# Patient Record
Sex: MALE | Race: WHITE | NOT HISPANIC OR LATINO | Employment: UNEMPLOYED | URBAN - METROPOLITAN AREA
[De-identification: names, ages, dates, MRNs, and addresses within clinical notes are randomized per-mention and may not be internally consistent; named-entity substitution may affect disease eponyms.]

---

## 2020-10-30 ENCOUNTER — HISTORICAL (OUTPATIENT)
Dept: RADIOLOGY | Facility: HOSPITAL | Age: 45
End: 2020-10-30

## 2020-12-16 ENCOUNTER — HISTORICAL (OUTPATIENT)
Dept: ADMINISTRATIVE | Facility: HOSPITAL | Age: 45
End: 2020-12-16

## 2020-12-18 ENCOUNTER — HISTORICAL (OUTPATIENT)
Dept: SURGERY | Facility: HOSPITAL | Age: 45
End: 2020-12-18

## 2022-04-07 ENCOUNTER — HISTORICAL (OUTPATIENT)
Dept: ADMINISTRATIVE | Facility: HOSPITAL | Age: 47
End: 2022-04-07

## 2022-04-23 VITALS
BODY MASS INDEX: 28.31 KG/M2 | OXYGEN SATURATION: 96 % | DIASTOLIC BLOOD PRESSURE: 105 MMHG | WEIGHT: 209 LBS | SYSTOLIC BLOOD PRESSURE: 156 MMHG | HEIGHT: 72 IN

## 2022-05-01 NOTE — HISTORICAL [facility] CERNER
This is a historical note converted from Domo. Formatting and pictures may have been removed.  Please reference [place] for original formatting and attached multimedia. Indication for Surgery  45yoM who had a history of mandibular hardware placed at least 10 yrs ago for a hyoid suspension.? He then presented to an outside ENT with submental erythema and concern of submental abscess.? I&D done by outside physician.? Then referred to us for us of infected hardware?and patient had continued edema and drainage from submental area.? Imaging obtained did not show any further submental fluid collection.? After discussion with and review of imaging with Dr. Hollingsworth there was a concern of a dental infection. Pt referred to dentist who did not find issue with dentition.? Pt put on several different rounds of antibiotics but continued to have submental drainage. Offered to remove potentially infected hardware.? Discussed chances that this may not resolve even after hardware removal, sleep apnea could worsen, dysphagia, numbness.? Pt would like to proceed with surgery.  Preoperative Diagnosis  infected hardware  Postoperative Diagnosis  infected hardware  Operation  removal of infected hardware  Surgeon(s)  Asuncion Barba MD-Resident  Stephon Reddy MD-Attending  Assistant  none  Anesthesia  general endotracheal  Estimated Blood Loss  45cc  Findings  small drainage site just posterior to previous scar that did not appear to have a fistula tract to the hardware.? No purulence noted but thickened inflamed tissue noted in the subcutaneous tissue.  Specimen(s)  submental fistula for culture, mandibular screws for pathology and culture  Complications  none  Technique  After proper informed consent was obtained, the patient was brought to the operating room and placed supine on the operating table. General endotracheal anesthesia was administered.? Local anesthesia with 1% lidocaine with 1:100,000 epinephrine was injected  in the submental area.? The submental area was then prepped with betadine and draped.? A proper time out was then completed.  ?  At the drainage site an elliptical incision was made around it with a 15 blade.? This was carried deep through the subcutaneous tissue.? Using a hemostat a small tract was followed and dissected out.? It did not appear to go any deeper than subcutaneous tissue.? The tissue around it and the skin are thickened and inflamed.? This was excised and sent for tissue culture.? Then using needle tip bovie?the muscles at the inferior mandibular symphysis were dissected off down to the periosteum.? A subperiosteal dissection was carried a little laterally to the left and on the posterior side of the mandible until the two screws were identified.? The screws were firmly in place and were unable to unscrewed with a hemostat.? The prolene suture was then cut and easily removed.? Then using a 2mm cutting bur a trough around the more inferior screw was then drilled until it could be removed with a hemostat.? This was done in similar fashion for the more superior screw.? The stitch was cut but retracted into the tissue and not easily identified.? Using a 2mm cutting bur a trough around the screw was drilled until the screw could be removed with a hemostat.? Both screws were sent for pathology and tissue culture.? The wound bed was copiously irrigated with clindamycin irrigation.? The submental muscles (mylohyoid and geniohyoid)?were re-approximated 3-0 vicryl in an interrupted fashion in multiple layers around a penrose drain.? The subcutaneous tissue was then re-approximated with 3-0 vcryl in an interrupted?fashion.? The skin was re-approximated with 3-0 nylon in an intterupted fashion and the penrose drian was secured with a 3-0 nylon.? The oral cavity was then examined ensure no connections to the floor of the mouth were made.? There was a punctate spot on the left of midline that was oversewed with an  interrupted 3-0 vicryl.? Dentition was not?loose.? The oral cavity was irrigated.? The stomach was suctioned free with an OG.? The patient was turned back to anesthesia, awakened, extubated, and taken to PACU in stable condition. All counts were correct x2. Dr. Reddy was scrubbed and present for the entirety of the?procedure.  ?  Asuncion Barba MD  Otolaryngology Resident [facility]
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
## 2023-09-29 ENCOUNTER — HOSPITAL ENCOUNTER (EMERGENCY)
Facility: HOSPITAL | Age: 48
Discharge: HOME | End: 2023-09-29
Attending: EMERGENCY MEDICINE
Payer: MEDICAID

## 2023-09-29 VITALS
BODY MASS INDEX: 28.17 KG/M2 | WEIGHT: 208 LBS | SYSTOLIC BLOOD PRESSURE: 160 MMHG | HEIGHT: 72 IN | DIASTOLIC BLOOD PRESSURE: 92 MMHG | RESPIRATION RATE: 20 BRPM | OXYGEN SATURATION: 100 % | TEMPERATURE: 98 F | HEART RATE: 95 BPM

## 2023-09-29 DIAGNOSIS — M23.91 INTERNAL DERANGEMENT OF RIGHT KNEE: Primary | ICD-10-CM
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
## 2023-09-29 PROCEDURE — 96372 THER/PROPH/DIAG INJ SC/IM: CPT | Performed by: EMERGENCY MEDICINE

## 2023-09-29 PROCEDURE — 99284 EMERGENCY DEPT VISIT MOD MDM: CPT

## 2023-09-29 PROCEDURE — 25000003 PHARM REV CODE 250: Performed by: EMERGENCY MEDICINE

## 2023-09-29 PROCEDURE — 63600175 PHARM REV CODE 636 W HCPCS: Performed by: EMERGENCY MEDICINE

## 2023-09-29 RX ORDER — KETOROLAC TROMETHAMINE 30 MG/ML
30 INJECTION, SOLUTION INTRAMUSCULAR; INTRAVENOUS
Status: COMPLETED | OUTPATIENT
Start: 2023-09-29 | End: 2023-09-29

## 2023-09-29 RX ORDER — HYDROCODONE BITARTRATE AND ACETAMINOPHEN 7.5; 325 MG/1; MG/1
1 TABLET ORAL EVERY 6 HOURS PRN
Qty: 20 TABLET | Refills: 0 | Status: SHIPPED | OUTPATIENT
Start: 2023-09-29 | End: 2023-10-04

## 2023-09-29 RX ORDER — NAPROXEN 500 MG/1
500 TABLET ORAL 2 TIMES DAILY WITH MEALS
Qty: 20 TABLET | Refills: 0 | Status: SHIPPED | OUTPATIENT
Start: 2023-09-29 | End: 2023-10-09

## 2023-09-29 RX ORDER — HYDROCODONE BITARTRATE AND ACETAMINOPHEN 7.5; 325 MG/1; MG/1
1 TABLET ORAL
Status: COMPLETED | OUTPATIENT
Start: 2023-09-29 | End: 2023-09-29

## 2023-09-29 RX ADMIN — HYDROCODONE BITARTRATE AND ACETAMINOPHEN 1 TABLET: 7.5; 325 TABLET ORAL at 06:09

## 2023-09-29 RX ADMIN — KETOROLAC TROMETHAMINE 30 MG: 30 INJECTION, SOLUTION INTRAMUSCULAR; INTRAVENOUS at 06:09

## 2023-09-29 NOTE — ED PROVIDER NOTES
Encounter Date: 9/29/2023       History     Chief Complaint   Patient presents with    Knee Injury     C/o right knee injury states yesterday he was come down from a latter and fell about foot of the ground and landed on his right knee. Pt. States he can feel popping when he tried to straighten it.      48-year-old male presents to the emergency department after fall off ladder yesterday, states landed on his legs but significant varus stress on his right knee.  Reports able to ambulate since that time however with significant pain.  States feels a clicking in instability in the right lateral knee when trying to ambulate.    The history is provided by the patient.   Knee Injury  This is a new problem. The current episode started yesterday. The problem occurs constantly. The problem has not changed since onset.Pertinent negatives include no shortness of breath. The symptoms are aggravated by walking.     Review of patient's allergies indicates:  No Known Allergies  No past medical history on file.  No past surgical history on file.  No family history on file.     Review of Systems   Constitutional:  Negative for fever.   Respiratory:  Negative for shortness of breath.    Musculoskeletal:  Positive for arthralgias. Negative for back pain.   Neurological:  Negative for weakness and numbness.       Physical Exam     Initial Vitals [09/29/23 0604]   BP Pulse Resp Temp SpO2   (!) 161/102 95 18 98.3 °F (36.8 °C) 100 %      MAP       --         Physical Exam    Nursing note and vitals reviewed.  Constitutional: He appears well-developed and well-nourished. No distress.   HENT:   Head: Normocephalic and atraumatic.   Mouth/Throat: Oropharynx is clear and moist.   Eyes: Conjunctivae are normal. Pupils are equal, round, and reactive to light.   Neck: Neck supple.   Cardiovascular:  Normal rate, regular rhythm and intact distal pulses.           Pulmonary/Chest: No respiratory distress.   Musculoskeletal:         General:  Tenderness (Tenderness of the right lateral margin of the knee extending up to the distal femur area.  No bony tenderness over the proximal tibia or medial knee.  No patellar tenderness or displacement) present.      Cervical back: Neck supple.     Neurological: He is alert and oriented to person, place, and time.   Skin: Skin is warm and dry.         ED Course   Procedures  Labs Reviewed - No data to display       Imaging Results              X-Ray Knee Complete 4 Or More Views Right (Final result)  Result time 09/29/23 07:47:21      Final result by Shaheen Ray MD (09/29/23 07:47:21)                   Impression:      No acute osseous process appreciated.      Electronically signed by: Shaheen Ray  Date:    09/29/2023  Time:    07:47               Narrative:    EXAMINATION:  XR KNEE COMP 4 OR MORE VIEWS RIGHT    CLINICAL HISTORY:  Unspecified fall, initial encounter    TECHNIQUE:  AP, oblique and lateral views of the right knee    COMPARISON:  None    FINDINGS:  No acute fracture identified.  Joint alignments are maintained.  No significant knee effusion.                                       Medications   ketorolac injection 30 mg (30 mg Intramuscular Given 9/29/23 0650)   HYDROcodone-acetaminophen 7.5-325 mg per tablet 1 tablet (1 tablet Oral Given 9/29/23 0650)     Medical Decision Making  Problems Addressed:  Fall: acute illness or injury  Internal derangement of right knee: acute illness or injury    Amount and/or Complexity of Data Reviewed  Radiology: ordered.    Risk  Prescription drug management.      ED assessment:    Mr. Gonzalez presented any pain after fall yesterday.  Ambulatory but with pain.  No gross deformity however feel some instability laterally with ambulation    Differential diagnosis (including but not limited to):   Proximal fibula fracture, patellar dislocation, internal derangement such as collateral ligament injury or meniscal injury.  Less likely knee dislocation or tibia  fracture    ED management:   X-ray reviewed, demonstrates no clinically significant acute abnormality, specifically no displaced fracture or significant joint effusion.  On lateral view, there is a radiodense ovoid opacity noted, considered possible donor site from a proximal fibula avulsion.  Reviewed with orthopedics who advised appears nonacute.  Counseled patient on likely ligamentous injury.  As he is experiencing some instability when trying to walk, will provide immobilizer for when he is ambulating; however, advised to avoid wearing around the clock to prevent excessive stiffness.  Analgesics provided on discharge as well. RICE discussed.  Referral submitted to orthopedic clinic. ED return precautions reviewed at the bedside and provided in the written discharge instructions. All questions answered to the best of my ability.       My independent radiology interpretation:   X-ray right knee:  No acute fracture or subluxation      Amount and/or Complexity of Data Reviewed  Independent historian: none   Summary of history:   External data reviewed: prescription medications   Summary of data reviewed: no current opioid prescriptions, is on adderall  Risk and benefits of testing: discussed     Radiology: ordered and independent interpretation performed (see above or ED course)    Discussion of management or test interpretation with external provider(s): discussed with Orthopedic surgery consultant   Summary of discussion:  As above    Risk  Prescription drug management   Shared decision making     Critical Care  none    I, Mary Bertrand MD personally performed the history, PE, MDM, and procedures as documented above and agree with the scribe's documentation.                              Clinical Impression:   Final diagnoses:  [W19.XXXA] Fall  [M23.91] Internal derangement of right knee (Primary)        ED Disposition Condition    Discharge Stable          ED Prescriptions       Medication Sig Dispense Start Date  End Date Auth. Provider    naproxen (NAPROSYN) 500 MG tablet Take 1 tablet (500 mg total) by mouth 2 (two) times daily with meals. for 10 days 20 tablet 9/29/2023 10/9/2023 Mary Bertrand MD    HYDROcodone-acetaminophen (NORCO) 7.5-325 mg per tablet Take 1 tablet by mouth every 6 (six) hours as needed for Pain. 20 tablet 9/29/2023 10/4/2023 Mary Berrtand MD          Follow-up Information       Follow up With Specialties Details Why Contact Info    [facility]  a referral has been submitted to this orthopedic clinic. Please call the clinic to ensure referral has been received and appointment scheduled. [address] [facility] [address] [phone]    [facility] Emergency Medicine  As needed, If symptoms worsen [address] [facility] [id number] [phone]             Mary Bertrand MD  09/30/23 6770

## 2023-10-05 ENCOUNTER — OFFICE VISIT (OUTPATIENT)
Dept: URGENT CARE | Facility: CLINIC | Age: 48
End: 2023-10-05
Payer: MEDICAID

## 2023-10-05 VITALS
BODY MASS INDEX: 29.4 KG/M2 | HEART RATE: 82 BPM | DIASTOLIC BLOOD PRESSURE: 105 MMHG | RESPIRATION RATE: 18 BRPM | HEIGHT: 71 IN | TEMPERATURE: 99 F | WEIGHT: 210 LBS | OXYGEN SATURATION: 98 % | SYSTOLIC BLOOD PRESSURE: 156 MMHG

## 2023-10-05 DIAGNOSIS — S89.92XD LEFT KNEE INJURY, SUBSEQUENT ENCOUNTER: Primary | ICD-10-CM

## 2023-10-05 PROCEDURE — 63600175 PHARM REV CODE 636 W HCPCS: Performed by: NURSE PRACTITIONER

## 2023-10-05 PROCEDURE — 99213 OFFICE O/P EST LOW 20 MIN: CPT | Mod: S$PBB,,, | Performed by: NURSE PRACTITIONER

## 2023-10-05 PROCEDURE — 99213 PR OFFICE/OUTPT VISIT, EST, LEVL III, 20-29 MIN: ICD-10-PCS | Mod: S$PBB,,, | Performed by: NURSE PRACTITIONER

## 2023-10-05 PROCEDURE — 99215 OFFICE O/P EST HI 40 MIN: CPT | Mod: PBBFAC | Performed by: NURSE PRACTITIONER

## 2023-10-05 RX ORDER — HYDROCODONE BITARTRATE AND ACETAMINOPHEN 7.5; 325 MG/1; MG/1
1 TABLET ORAL EVERY 8 HOURS PRN
Qty: 21 TABLET | Refills: 0 | Status: SHIPPED | OUTPATIENT
Start: 2023-10-05 | End: 2023-10-12

## 2023-10-05 RX ORDER — LISINOPRIL 10 MG/1
10 TABLET ORAL
COMMUNITY
Start: 2023-09-06

## 2023-10-05 RX ORDER — ONDANSETRON 4 MG/1
4 TABLET, ORALLY DISINTEGRATING ORAL EVERY 6 HOURS PRN
COMMUNITY
Start: 2023-08-11 | End: 2023-10-23

## 2023-10-05 RX ORDER — KETOROLAC TROMETHAMINE 30 MG/ML
60 INJECTION, SOLUTION INTRAMUSCULAR; INTRAVENOUS
Status: COMPLETED | OUTPATIENT
Start: 2023-10-05 | End: 2023-10-05

## 2023-10-05 RX ORDER — DULOXETIN HYDROCHLORIDE 60 MG/1
60 CAPSULE, DELAYED RELEASE ORAL 2 TIMES DAILY
COMMUNITY
Start: 2023-09-24

## 2023-10-05 RX ORDER — AMLODIPINE BESYLATE 5 MG/1
5 TABLET ORAL
COMMUNITY
Start: 2023-09-06

## 2023-10-05 RX ORDER — TERBINAFINE HYDROCHLORIDE 250 MG/1
250 TABLET ORAL EVERY MORNING
COMMUNITY
Start: 2023-09-11

## 2023-10-05 RX ORDER — DEXTROAMPHETAMINE SACCHARATE, AMPHETAMINE ASPARTATE, DEXTROAMPHETAMINE SULFATE AND AMPHETAMINE SULFATE 5; 5; 5; 5 MG/1; MG/1; MG/1; MG/1
1 TABLET ORAL 2 TIMES DAILY
COMMUNITY
Start: 2023-09-07

## 2023-10-05 RX ORDER — ARIPIPRAZOLE 5 MG/1
5 TABLET ORAL
COMMUNITY
Start: 2023-09-24

## 2023-10-05 RX ADMIN — KETOROLAC TROMETHAMINE 60 MG: 30 INJECTION, SOLUTION INTRAMUSCULAR at 12:10

## 2023-10-05 NOTE — LETTER
October 5, 2023      [facility]  [address] [facility] [address]  Phone: [phone]       Patient: Harman Gonzalez   YOB: 1975  Date of Visit: 10/05/2023    To Whom It May Concern:    French Gonzalez  was at [facility] on 10/05/2023. The patient may return to work/school on 10/6/23 with no restrictions. If you have any questions or concerns, or if I can be of further assistance, please do not hesitate to contact me.    Sincerely,    GHASSAN Peterson

## 2023-10-05 NOTE — PATIENT INSTRUCTIONS
Please follow instructions on patient education material.    Go to ER for increased swelling , increased pain, warmth, shortness of breath Chest pain, discoloration, numbness and tingling, calf pain redness or swelling.     Knee Immobilizer and Crutches fitted in UC.  Instructed to avoid weight bearing on right knee.  Ok to go to work as long as can wear immobilizer and use at least 1 crutch/ do not increase activity with knee as directed, but it would be wise to use the knee as less as possible to not increase possible injury.  Follow up with [facility] on  10/23 as discussed.  Pain meds Rx'ed/sent to pharmacy- given prescription for Norco- take sparingly and use Naproxen for break through pain.    Please read the attached information about NSAIDs.    Please use the prescriptions that were sent for you.     You got a Toradol shot in clinic today. For the next 8 hours do not take:  Ibuprofen  Naproxen  Advil  Aleve  Motrin  Ketorolac   Diclofenac  Meloxicam   Elevate as much as possible.  Light ROM exercises as tolerated.  Very light weightbearing as tolerated.   Limit icing the knee to 3x/day, 10 mins max.

## 2023-10-05 NOTE — PROGRESS NOTES
"Subjective:      Patient ID: Harman Gonzalez is a 48 y.o. male.    Vitals:  height is 5' 11" (1.803 m) and weight is 95.3 kg (210 lb). His oral temperature is 98.9 °F (37.2 °C). His blood pressure is 156/105 (abnormal) and his pulse is 82. His respiration is 18 and oxygen saturation is 98%.     Chief Complaint: Leg Injury (Pt reports pain in right knee. Fell last week. Was seen at [facility] ER. Was prescribed meds and referred to ortho (can't get in til end of the month). Ran out of medicine. Was told by ortho to come here.   )    HPI As stated in CC. Pt was seen in ER 9/28/2029, schedules to See Ortho 10/23/203, but need pain medicine. Pt reports he has taken all Norco prescribed to him and still had Naproxen. 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  Pt denies sob, chest pain or fever.   ROS   Objective:     Physical Exam   Constitutional: He appears well-developed.  Non-toxic appearance. He does not appear ill. No distress.   Abdominal: Normal appearance.   Musculoskeletal:         General: Tenderness present.      Comments: Removed brace for exam.- No patella tenderness or deformity, mild TTP at lateral and medial knee, no discoloration or edema to extremity, mild swelling over knee and medial area of knee, no calf tightness, negative jaylin's sign, good cap reill, good strong pedal pulse, able to wiggle toes.    Neurological: no focal deficit. He is alert. He displays no weakness.   Skin: Skin is not diaphoretic. Capillary refill takes less than 2 seconds.   Psychiatric: His behavior is normal. Mood normal.   Nursing note and vitals reviewed.      Assessment:     1. Left knee injury, subsequent encounter    X-Ray Knee Complete 4 Or More Views Right    Result Date: 9/29/2023  EXAMINATION: XR KNEE COMP 4 OR MORE VIEWS RIGHT CLINICAL HISTORY: Unspecified fall, initial encounter TECHNIQUE: AP, oblique and lateral views of the right knee COMPARISON: None FINDINGS: No acute fracture identified.  " Joint alignments are maintained.  No significant knee effusion.     No acute osseous process appreciated. Electronically signed by: Shaheen Ray Date:    09/29/2023 Time:    07:47       Plan:   Er precautions given.  Ok to go to work as long as can wear immobilizer and use at least 1 crutch/ do not increase activity with knee as directed, but it would be wise to use the knee as less as possible to not increase possible injury.  Follow up with [facility] Orth[facility] on  10/23 as discussed.  Pain meds Rx'ed/sent to pharmacy- given prescription for Norco- take sparingly and use Naproxen for break through pain.  Please read the attached information about NSAIDs.You got a Toradol shot in clinic today. For the next 8 hours do not take more of NSAID.     Left knee injury, subsequent encounter  -     HYDROcodone-acetaminophen (NORCO) 7.5-325 mg per tablet; Take 1 tablet by mouth every 8 (eight) hours as needed for Pain.  Dispense: 21 tablet; Refill: 0  -     ketorolac injection 60 mg

## 2023-10-23 ENCOUNTER — HOSPITAL ENCOUNTER (OUTPATIENT)
Dept: RADIOLOGY | Facility: HOSPITAL | Age: 48
Discharge: HOME | End: 2023-10-23
Attending: STUDENT IN AN ORGANIZED HEALTH CARE EDUCATION/TRAINING PROGRAM
Payer: MEDICAID

## 2023-10-23 ENCOUNTER — OFFICE VISIT (OUTPATIENT)
Dept: ORTHOPEDICS | Facility: CLINIC | Age: 48
End: 2023-10-23
Payer: MEDICAID

## 2023-10-23 VITALS
SYSTOLIC BLOOD PRESSURE: 163 MMHG | DIASTOLIC BLOOD PRESSURE: 104 MMHG | BODY MASS INDEX: 29.68 KG/M2 | HEIGHT: 71 IN | WEIGHT: 212 LBS | HEART RATE: 69 BPM

## 2023-10-23 DIAGNOSIS — M25.561 RIGHT KNEE PAIN, UNSPECIFIED CHRONICITY: ICD-10-CM

## 2023-10-23 DIAGNOSIS — M23.91 INTERNAL DERANGEMENT OF RIGHT KNEE: Primary | ICD-10-CM

## 2023-10-23 PROCEDURE — 99214 OFFICE O/P EST MOD 30 MIN: CPT | Mod: PBBFAC

## 2023-10-23 PROCEDURE — 73564 X-RAY EXAM KNEE 4 OR MORE: CPT | Mod: TC,RT

## 2023-10-23 RX ORDER — TERBINAFINE HYDROCHLORIDE 250 MG/1
250 TABLET ORAL
COMMUNITY
Start: 2023-08-10 | End: 2023-11-08

## 2023-10-23 RX ORDER — DICLOFENAC SODIUM 75 MG/1
75 TABLET, DELAYED RELEASE ORAL 3 TIMES DAILY PRN
Qty: 30 TABLET | Refills: 0 | Status: SHIPPED | OUTPATIENT
Start: 2023-10-23 | End: 2023-11-22

## 2023-10-23 RX ORDER — BUSPIRONE HYDROCHLORIDE 15 MG/1
15 TABLET ORAL 3 TIMES DAILY
COMMUNITY
Start: 2023-10-09

## 2023-10-23 RX ORDER — CLONIDINE HYDROCHLORIDE 0.1 MG/1
0.1 TABLET ORAL DAILY PRN
COMMUNITY
Start: 2023-02-10 | End: 2024-02-10

## 2023-10-23 RX ORDER — ALBUTEROL SULFATE 90 UG/1
AEROSOL, METERED RESPIRATORY (INHALATION)
COMMUNITY
Start: 2023-01-20
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
## 2023-10-30 NOTE — PROGRESS NOTES
Faculty Attestation: Harman Gonzalez  was seen in Sports Medicine Clinic. Discussed with Dr. Sandoval at the time of the visit. History of Present Illness, Physical Exam, and Assessment and Plan reviewed. Treatment plan is reasonable and appropriate. Compliance with treatment recommendations is important.  Radiology images independently reviewed and agree with fellow interpretation.  No procedure was performed.     Shila Eaton MD  Sports Medicine

## 2023-11-07 ENCOUNTER — OFFICE VISIT (OUTPATIENT)
Dept: ORTHOPEDICS | Facility: CLINIC | Age: 48
End: 2023-11-07
Payer: MEDICAID

## 2023-11-07 VITALS
WEIGHT: 212 LBS | HEART RATE: 89 BPM | SYSTOLIC BLOOD PRESSURE: 147 MMHG | BODY MASS INDEX: 29.68 KG/M2 | HEIGHT: 71 IN | DIASTOLIC BLOOD PRESSURE: 97 MMHG

## 2023-11-07 DIAGNOSIS — S83.511D RUPTURE OF ANTERIOR CRUCIATE LIGAMENT OF RIGHT KNEE, SUBSEQUENT ENCOUNTER: Primary | ICD-10-CM

## 2023-11-07 PROCEDURE — 99213 OFFICE O/P EST LOW 20 MIN: CPT | Mod: PBBFAC

## 2023-11-07 RX ORDER — SILDENAFIL 100 MG/1
100 TABLET, FILM COATED ORAL DAILY PRN
COMMUNITY
Start: 2023-11-03

## 2023-11-07 RX ORDER — DOXEPIN HYDROCHLORIDE 25 MG/1
25 CAPSULE ORAL NIGHTLY
COMMUNITY
Start: 2023-11-06

## 2023-11-07 NOTE — PROGRESS NOTES
"Subjective:    Patient ID: Harman Gonzalez is a 48 y.o. male  who presented to [facility] for follow up.  Established patient      Chief Complaint: Pain of the Right Knee (Here for right knee pain, MRI done on 10/27.)      History of Present Illness: Harman Gonzalez presented today to review MRI results from a right knee injury that he sustained on 09/28/2023.  Original mechanism of injury was stepping down off of a stool at home with his knee flexed where he felt immediate pain and burning but denied hearing a pop.  He has been experiencing instability, giving-way and mechanical symptoms with daily activities limiting work related activities.  Otherwise he is had no new injuries.  He continues with home exercise program and oral medications.    Knee Review of Systems:  Swelling?  yes  Instability?  yes  Mechanical sx?  yes  <30 min AM stiffness? yes  Limited ROM? yes  Fever/Chills? no       Objective:      Physical Exam:    BP (!) 147/97   Pulse 89   Ht 5' 11" (1.803 m)   Wt 96.2 kg (212 lb)   BMI 29.57 kg/m²     Ortho/SPM Exam    Appearance:  limping  FWB  Alignment: Left: normal Right: normal   Soft tissue swelling: Left: no Right: yes  Effusion: Left:  Negative Right: Positive  Erythema: Left no Right: no  Ecchymosis: Left: no Right: no  Atrophy: Left: no Right: no     Palpation:  Knee Tenderness: Left: None Right: Medial joint line, Lateral joint line, Patella, Patellar tendon, Medial collateral ligament, and lateral collateral ligament     Range of motion:  Flexion (140): Left:  140 Right: 140  Extension (0): Left: 0 Right: 0     Strength:  Extension: Left 5/5  Pain: no     Right 5/5 Pain: no  Flexion: Left 5/5 Pain: no        Right   5/5 Pain: no      Special Tests:  Ballotable Effusion:Left: Negative Right: Positive   Fluid Wave: Left: Negative Right: Negative   Crepitus: Left: Negative Right: Positive   Patellar grind test: Left: Negative  Right: " Negative  Apprehension test: Left: Negative Right: Negative   Varus: @ 0, Left Negative Right: Negative.  @ 30, Left Negative  Right Negative   Valgus: @ 0, Left Negative Right: Negative.  @ 30, Left Negative  Right Negative  Lachman:  Unable to perform secondary to increased tone and fasciculations.    Ant Drawer: Left: Negative Right: poor endpoint  Posterior Drawer: Left: Negative Right: Negative      General appearance: NAD  Peripheral pulses: normal bilaterally   Reflexes: Left: normal Right normal   Sensation: normal    Imaging:   Previous images reviewed.  X-rays ordered and performed today: no    10/28/2023 MRI: Osseous contusion mild impaction at the posterior lateral tibial plateau without significant articular surface depression disruption.  Grade 2 ACL sprain.  Suspected grade 1 MCL sprain with mild chronic scarring degeneration of the proximal 3rd of the ligament.  Mild patellar tendinosis with no tendon tear.      Assessment:        Encounter Diagnoses   Code Name Primary?    S83.511D Rupture of anterior cruciate ligament of right knee, subsequent encounter Yes        Plan:      Dx:  Right grade 2 ACL sprain and grade 1 MCL - acute injury in moderate exacerbation.  Treatment Plan: Discussed with patient diagnosis, prognosis, and treatment recommendations. Education provided.    - Discussed operative versus nonoperative management patient is electing for operative management today.  The patient has a partial ACL tear but is functionally unstable.  He is interested in discussion with Orthopedics about surgical interventions for instability and mechanical symptoms.   Imaging: prior radiological studies independently reviewed; discussed with patient; agree with radiologist interpretation.   Activity: Activity as tolerated; HEP to include aerobic conditioning and strength training with non-painful activity. ROM/STG exercises. Proper footware; assistive devises to avoid limping.   Therapy: Physical Therapy;   hinged knee brace  Medication:  Continue home medications. Please see your primary care physician for further refills.  RTC: PRN; call if any issues.  Schedule with Orthopedic Surgery for discussion    Ronen Sandoval MD.  Note dictated using MModal.

## 2023-11-07 NOTE — PROGRESS NOTES
Faculty Attestation: Harman Gonzalez  was seen in Sports Medicine Clinic. Discussed with Dr. Sandoval at the time of the visit. History of Present Illness, Physical Exam, and Assessment and Plan reviewed. Treatment plan is reasonable and appropriate. Compliance with treatment recommendations is important.  Radiology images independently reviewed and agree with radiologist interpretation. No procedure was performed.     Beverly Salazar MD  Sports Medicine

## 2023-12-18 ENCOUNTER — OFFICE VISIT (OUTPATIENT)
Dept: ORTHOPEDICS | Facility: CLINIC | Age: 48
End: 2023-12-18
Payer: MEDICAID

## 2023-12-18 VITALS
TEMPERATURE: 98 F | RESPIRATION RATE: 20 BRPM | HEIGHT: 71 IN | DIASTOLIC BLOOD PRESSURE: 111 MMHG | WEIGHT: 221.38 LBS | OXYGEN SATURATION: 97 % | BODY MASS INDEX: 30.99 KG/M2 | SYSTOLIC BLOOD PRESSURE: 164 MMHG | HEART RATE: 87 BPM

## 2023-12-18 DIAGNOSIS — S83.511A RUPTURE OF ANTERIOR CRUCIATE LIGAMENT OF RIGHT KNEE, INITIAL ENCOUNTER: Primary | ICD-10-CM

## 2023-12-18 PROCEDURE — 99499 NO LOS: ICD-10-PCS | Mod: S$PBB,,, | Performed by: ORTHOPAEDIC SURGERY

## 2023-12-18 PROCEDURE — 3008F BODY MASS INDEX DOCD: CPT | Mod: CPTII,,, | Performed by: ORTHOPAEDIC SURGERY

## 2023-12-18 PROCEDURE — 1159F MED LIST DOCD IN RCRD: CPT | Mod: CPTII,,, | Performed by: ORTHOPAEDIC SURGERY

## 2023-12-18 PROCEDURE — 3080F PR MOST RECENT DIASTOLIC BLOOD PRESSURE >= 90 MM HG: ICD-10-PCS | Mod: CPTII,,, | Performed by: ORTHOPAEDIC SURGERY

## 2023-12-18 PROCEDURE — 99499 UNLISTED E&M SERVICE: CPT | Mod: S$PBB,,, | Performed by: ORTHOPAEDIC SURGERY

## 2023-12-18 PROCEDURE — 3077F PR MOST RECENT SYSTOLIC BLOOD PRESSURE >= 140 MM HG: ICD-10-PCS | Mod: CPTII,,, | Performed by: ORTHOPAEDIC SURGERY

## 2023-12-18 PROCEDURE — 3008F PR BODY MASS INDEX (BMI) DOCUMENTED: ICD-10-PCS | Mod: CPTII,,, | Performed by: ORTHOPAEDIC SURGERY

## 2023-12-18 PROCEDURE — 3080F DIAST BP >= 90 MM HG: CPT | Mod: CPTII,,, | Performed by: ORTHOPAEDIC SURGERY

## 2023-12-18 PROCEDURE — 3077F SYST BP >= 140 MM HG: CPT | Mod: CPTII,,, | Performed by: ORTHOPAEDIC SURGERY

## 2023-12-18 PROCEDURE — 99214 OFFICE O/P EST MOD 30 MIN: CPT | Mod: PBBFAC

## 2023-12-18 PROCEDURE — 1159F PR MEDICATION LIST DOCUMENTED IN MEDICAL RECORD: ICD-10-PCS | Mod: CPTII,,, | Performed by: ORTHOPAEDIC SURGERY

## 2023-12-18 PROCEDURE — 4010F ACE/ARB THERAPY RXD/TAKEN: CPT | Mod: CPTII,,, | Performed by: ORTHOPAEDIC SURGERY

## 2023-12-18 PROCEDURE — 4010F PR ACE/ARB THEARPY RXD/TAKEN: ICD-10-PCS | Mod: CPTII,,, | Performed by: ORTHOPAEDIC SURGERY

## 2023-12-18 NOTE — PROGRESS NOTES
[facility]  Established Patient Office Visit  12/18/2023       Patient ID: Harman Gonzalez  YOB: 1975  MRN: [id number]    Chief Complaint: Numbness, Pain, and Swelling of the Right Knee (Here for f/u check up. Complaining on his right foot that is on Pain and he said his taking tylenol but its not helping.)      HPI:  Harman Gonzalez is a 48 y.o. male who sustained a right knee injury in September of this year.  Who was stepping off of a step ladder onto his right knee and had a twisting injury.  Afterwards he was unable to bear significant weight.  His pain has somewhat improved, but still significantly painful.  He does no significant instability of the knee.  He works as a  in his not been able to significantly do his job secondary to knee instability and pain.  He has been undergoing a home exercise program and has full range of motion.    Past Medical History:    History reviewed. No pertinent past medical history.  History reviewed. No pertinent surgical history.  History reviewed. No pertinent family history.  Social History     Socioeconomic History    Marital status: Single   Tobacco Use    Smoking status: Every Day     Types: Cigarettes, Vaping with nicotine    Smokeless tobacco: Never   Substance and Sexual Activity    Alcohol use: Not Currently    Drug use: Not Currently    Sexual activity: Yes   Social History Narrative    ** Merged History Encounter **          Medication List with Changes/Refills   Current Medications    ALBUTEROL (PROVENTIL/VENTOLIN HFA) 90 MCG/ACTUATION INHALER    TAKE 2 PUFFS (INHALATION) 4 TIMES PER DAY AS NEEDED FOR COUGH) FOR 10 DAYS    AMLODIPINE (NORVASC) 5 MG TABLET    Take 5 mg by mouth.    ARIPIPRAZOLE (ABILIFY) 5 MG TAB    Take 5 mg by mouth.    BUSPIRONE (BUSPAR) 15 MG TABLET    Take 15 mg by mouth 3 (three) times daily.    CLONIDINE (CATAPRES) 0.1 MG TABLET    Take 0.1 mg by mouth daily as needed.     DEXTROAMPHETAMINE-AMPHETAMINE (ADDERALL) 20 MG TABLET    Take 1 tablet by mouth 2 (two) times daily.    DOXEPIN (SINEQUAN) 25 MG CAPSULE    Take 25 mg by mouth every evening.    DULOXETINE (CYMBALTA) 60 MG CAPSULE    Take 60 mg by mouth 2 (two) times daily.    LISINOPRIL 10 MG TABLET    Take 10 mg by mouth.    SILDENAFIL (VIAGRA) 100 MG TABLET    Take 100 mg by mouth daily as needed.    TERBINAFINE HCL (LAMISIL) 250 MG TABLET    Take 250 mg by mouth every morning.     Review of patient's allergies indicates:   Allergen Reactions    Cortisone      Other reaction(s): uncontrollable hiccups    Iodinated contrast media Rash       Physical Exam:    Right Lower extremity:  No gross deformity, open wounds, abrasions, effusions  Grossly TTP over the knee, medial and lateral joint lines  Motor intact: ehl/fhl/ta/gsc/ham/quad/ip  SILT: dp/sp/t/ring/sa  Knee range of motion:  0-135 degrees.  2B Lachman.  Positive anterior drawer.  Negative posterior drawer.  Knee is stable to varus and valgus stress at 0 and 30°  2+ dp pulse    Imaging:  Independent review  10/23/2023: X-ray right knee demonstrates no acute osseous abnormalities, fractures, or dislocations  10/23/2023: MRI right knee demonstrates a proximal ACL tear that appears full-thickness on MRI.  There is some edema around the MCL.  No meniscal tears    Assessment and Plan:    Harman Gonzalez is a 48 y.o. male with right knee ACL tear and grade 1 MCL sprain.    -risks and benefits of operative and nonoperative treatment were discussed with the patient at length.  In regards to the ACL given his activity level I recommend ACL reconstruction.  For the MCL recommend nonoperative treatment.  Patient is in agreement  -01/16/2023: Right knee arthroscopy with ACL reconstruction using allograft      Camilo Rodriguez MD  [facility] Orthopaedic Surgery PGY-5

## 2023-12-18 NOTE — PROGRESS NOTES
Faculty Attestation: Harman Gonzalez  was seen at [facility] in the Orthopaedic Clinic. History of Present Illness, Physical Exam, and Assessment and Plan reviewed. Treatment plan is reasonable and appropriate. Compliance with treatment recommendations is important. Discussed with the resident at the time of the visit.  No procedure was performed.     Madhu Correa Jr, MD  Orthopaedic Surgery

## 2023-12-27 ENCOUNTER — TELEPHONE (OUTPATIENT)
Dept: ORTHOPEDICS | Facility: CLINIC | Age: 48
End: 2023-12-27
Payer: MEDICAID

## 2023-12-27 NOTE — TELEPHONE ENCOUNTER
Patient called would like to talk to you about his surgery has question about using allograft vs autograft  for the ACL repair and how long does the allograft tendon last for.    Please call to talk with him?

## 2024-01-10 RX ORDER — MUPIROCIN 20 MG/G
OINTMENT TOPICAL
Status: CANCELLED | OUTPATIENT
Start: 2024-01-10

## 2024-01-10 RX ORDER — SODIUM CHLORIDE 9 MG/ML
INJECTION, SOLUTION INTRAVENOUS CONTINUOUS
Status: CANCELLED | OUTPATIENT
Start: 2024-01-10

## 2024-01-11 ENCOUNTER — ANESTHESIA EVENT (OUTPATIENT)
Dept: SURGERY | Facility: HOSPITAL | Age: 49
End: 2024-01-11
Payer: MEDICAID

## 2024-01-11 PROBLEM — G47.30 SLEEP APNEA: Status: ACTIVE | Noted: 2024-01-11

## 2024-01-11 PROBLEM — Z91.041 CONTRAST MEDIA ALLERGY: Status: ACTIVE | Noted: 2024-01-11

## 2024-01-11 PROBLEM — I10 HYPERTENSION: Status: ACTIVE | Noted: 2024-01-11

## 2024-01-11 PROBLEM — F90.9 ADHD: Status: ACTIVE | Noted: 2024-01-11

## 2024-01-11 NOTE — ANESTHESIA PREPROCEDURE EVALUATION
"  Harman Gonzalez is a 48 y.o. male presenting for RECONSTRUCTION, KNEE, ACL, USING GRAFT (Right) with a history of right ACL injury, sleep apnea, HTN (poorly contolled) , borderline HLD, cervical spondylosis, ADHD, Depression, BMI 30, former smoker, occ marijuana use      Vitals:    01/16/24 0722 01/16/24 0730 01/16/24 0746 01/16/24 0807   BP: (!) 156/108 (!) 137/93 (!) 137/93 133/72   Pulse:    88   Resp:    20   Temp:    36 °C (96.8 °F)   TempSrc:    Temporal   SpO2:    100%   Weight:          Allergies to cortisone and contrast media    BETA-BLOCKER:  None    New Orders for Anesthesia: None    Patient Active Problem List   Diagnosis    Hypertension    Sleep apnea    Contrast media allergy    ADHD       Past Surgical History:   Procedure Laterality Date    TONSILLECTOMY         Lab Results   Component Value Date    WBC 9.8 08/10/2023    HGB 16.3 08/10/2023    HCT 47.4 08/10/2023     08/10/2023       No results found for: "NA", "K", "CHLORIDE", "BUN", "CREATININE", "GLUCOSE", "CALCIUM", "ALKPHOS", "LABPROT", "ALBUMIN", "BILIDIR", "IBILI", "AST", "ALT", "MG", "PHOS", "[id number]"    Lab Results   Component Value Date    HGBA1C 5.6 11/23/2020       Lab Results   Component Value Date    TSH 0.700 08/10/2023       Current Outpatient Medications   Medication Instructions    albuterol (PROVENTIL/VENTOLIN HFA) 90 mcg/actuation inhaler TAKE 2 PUFFS (INHALATION) 4 TIMES PER DAY AS NEEDED FOR COUGH) FOR 10 DAYS    amLODIPine (NORVASC) 5 mg, Oral    ARIPiprazole (ABILIFY) 5 mg, Oral    busPIRone (BUSPAR) 15 mg, Oral, 3 times daily    cloNIDine (CATAPRES) 0.1 mg, Oral, Daily PRN    dextroamphetamine-amphetamine (ADDERALL) 20 mg tablet 1 tablet, Oral, 2 times daily    doxepin (SINEQUAN) 25 mg, Oral, Nightly    DULoxetine (CYMBALTA) 60 mg, Oral, 2 times daily    lisinopriL 10 mg, Oral    sildenafiL (VIAGRA) 100 mg, Oral, Daily PRN    terbinafine HCL (LAMISIL) 250 mg, Oral, Every morning       Past anesthesia records: " None    Lab Results   Component Value Date    WBC 9.8 08/10/2023    HGB 16.3 08/10/2023    HCT 47.4 08/10/2023     08/10/2023    CHOL 185 08/10/2023    TRIG 60 08/10/2023    HDL 56 08/10/2023    TSH 0.700 08/10/2023    HGBA1C 5.6 11/23/2020        Pre-op Assessment    I have reviewed the Patient Summary Reports.     I have reviewed the Nursing Notes. I have reviewed the NPO Status.   I have reviewed the Medications.     Review of Systems  Anesthesia Hx:  No problems with previous Anesthesia   History of prior surgery of interest to airway management or planning:          Denies Family Hx of Anesthesia complications.    Denies Personal Hx of Anesthesia complications.                    Hematology/Oncology:  Hematology Normal   Oncology Normal                                   EENT/Dental:  EENT/Dental Normal           Cardiovascular:  Cardiovascular Normal                                            Pulmonary:  Pulmonary Normal                       Renal/:  Renal/ Normal                 Hepatic/GI:  Hepatic/GI Normal                 Musculoskeletal:  Musculoskeletal Normal                Neurological:  Neurology Normal                                      Endocrine:  Endocrine Normal            Dermatological:  Skin Normal    Psych:  Psychiatric Normal                    Physical Exam  General: Cooperative, Well nourished, Alert and Oriented    Airway:  Mallampati: II / III/ II  Mouth Opening: Normal  TM Distance: Normal  Tongue: Normal  Neck ROM: Normal ROM    Dental:  Intact        Anesthesia Plan  Type of Anesthesia, risks & benefits discussed:    Anesthesia Type: Gen Supraglottic Airway  Intra-op Monitoring Plan: Standard ASA Monitors  Post Op Pain Control Plan: multimodal analgesia, peripheral nerve block and IV/PO Opioids PRN  Induction:  IV  Airway Plan: Direct  Informed Consent: Informed consent signed with the Patient and all parties understand the risks and agree with anesthesia plan.  All  questions answered. Patient consented to blood products? No  ASA Score: 2  Day of Surgery Review of History & Physical: H&P Update referred to the surgeon/provider.    Ready For Surgery From Anesthesia Perspective.     .

## 2024-01-14 NOTE — H&P
Interval H&P    Patient seen and examined in preop holding area. No changes to history or exam other than noted below.    To OR today for right knee arthroscopy, ACL reconstruction with allograft    [facility]  Established Patient Office Visit  12/18/2023         Patient ID: Harman Gonzalez  YOB: 1975  MRN: [id number]     Chief Complaint: Numbness, Pain, and Swelling of the Right Knee (Here for f/u check up. Complaining on his right foot that is on Pain and he said his taking tylenol but its not helping.)        HPI:  Harman Gonzalez is a 48 y.o. male who sustained a right knee injury in September of this year.  Who was stepping off of a step ladder onto his right knee and had a twisting injury.  Afterwards he was unable to bear significant weight.  His pain has somewhat improved, but still significantly painful.  He does no significant instability of the knee.  He works as a  in his not been able to significantly do his job secondary to knee instability and pain.  He has been undergoing a home exercise program and has full range of motion.     Past Medical History:     History reviewed. No pertinent past medical history.  History reviewed. No pertinent surgical history.  History reviewed. No pertinent family history.  Social History               Socioeconomic History    Marital status: Single   Tobacco Use    Smoking status: Every Day       Types: Cigarettes, Vaping with nicotine    Smokeless tobacco: Never   Substance and Sexual Activity    Alcohol use: Not Currently    Drug use: Not Currently    Sexual activity: Yes   Social History Narrative     ** Merged History Encounter **                    Medication List with Changes/Refills   Current Medications     ALBUTEROL (PROVENTIL/VENTOLIN HFA) 90 MCG/ACTUATION INHALER    TAKE 2 PUFFS (INHALATION) 4 TIMES PER DAY AS NEEDED FOR COUGH) FOR 10 DAYS     AMLODIPINE (NORVASC) 5 MG TABLET    Take 5 mg by  mouth.     ARIPIPRAZOLE (ABILIFY) 5 MG TAB    Take 5 mg by mouth.     BUSPIRONE (BUSPAR) 15 MG TABLET    Take 15 mg by mouth 3 (three) times daily.     CLONIDINE (CATAPRES) 0.1 MG TABLET    Take 0.1 mg by mouth daily as needed.     DEXTROAMPHETAMINE-AMPHETAMINE (ADDERALL) 20 MG TABLET    Take 1 tablet by mouth 2 (two) times daily.     DOXEPIN (SINEQUAN) 25 MG CAPSULE    Take 25 mg by mouth every evening.     DULOXETINE (CYMBALTA) 60 MG CAPSULE    Take 60 mg by mouth 2 (two) times daily.     LISINOPRIL 10 MG TABLET    Take 10 mg by mouth.     SILDENAFIL (VIAGRA) 100 MG TABLET    Take 100 mg by mouth daily as needed.     TERBINAFINE HCL (LAMISIL) 250 MG TABLET    Take 250 mg by mouth every morning.            Review of patient's allergies indicates:   Allergen Reactions    Cortisone         Other reaction(s): uncontrollable hiccups    Iodinated contrast media Rash         Physical Exam:     Right Lower extremity:  No gross deformity, open wounds, abrasions, effusions  Grossly TTP over the knee, medial and lateral joint lines  Motor intact: ehl/fhl/ta/gsc/ham/quad/ip  SILT: dp/sp/t/ring/sa  Knee range of motion:  0-135 degrees.  2B Lachman.  Positive anterior drawer.  Negative posterior drawer.  Knee is stable to varus and valgus stress at 0 and 30°  2+ dp pulse     Imaging:  Independent review  10/23/2023: X-ray right knee demonstrates no acute osseous abnormalities, fractures, or dislocations  10/23/2023: MRI right knee demonstrates a proximal ACL tear that appears full-thickness on MRI.  There is some edema around the MCL.  No meniscal tears     Assessment and Plan:     Harman Gonzalez is a 48 y.o. male with right knee ACL tear and grade 1 MCL sprain.     -risks and benefits of operative and nonoperative treatment were discussed with the patient at length.  In regards to the ACL given his activity level I recommend ACL reconstruction.  For the MCL recommend nonoperative treatment.  Patient is in  agreement  -01/16/2023: Right knee arthroscopy with ACL reconstruction using allograft        Camilo Rodriguez MD  [facility] Orthopaedic Surgery PGY-5

## 2024-01-16 ENCOUNTER — HOSPITAL ENCOUNTER (OUTPATIENT)
Facility: HOSPITAL | Age: 49
Discharge: HOME | End: 2024-01-16
Attending: ORTHOPAEDIC SURGERY | Admitting: ORTHOPAEDIC SURGERY
Payer: MEDICAID

## 2024-01-16 ENCOUNTER — ANESTHESIA (OUTPATIENT)
Dept: SURGERY | Facility: HOSPITAL | Age: 49
End: 2024-01-16
Payer: MEDICAID

## 2024-01-16 DIAGNOSIS — S83.511A RUPTURE OF ANTERIOR CRUCIATE LIGAMENT OF RIGHT KNEE, INITIAL ENCOUNTER: ICD-10-CM

## 2024-01-16 PROCEDURE — 36000711: Performed by: ORTHOPAEDIC SURGERY

## 2024-01-16 PROCEDURE — 64447 NJX AA&/STRD FEMORAL NRV IMG: CPT | Mod: 59,RT | Performed by: SPECIALIST

## 2024-01-16 PROCEDURE — D9220A PRA ANESTHESIA: Mod: ANES,,, | Performed by: SPECIALIST

## 2024-01-16 PROCEDURE — 29888 ARTHRS AID ACL RPR/AGMNTJ: CPT | Mod: RT,,, | Performed by: ORTHOPAEDIC SURGERY

## 2024-01-16 PROCEDURE — C1762 CONN TISS, HUMAN(INC FASCIA): HCPCS | Performed by: ORTHOPAEDIC SURGERY

## 2024-01-16 PROCEDURE — 25000003 PHARM REV CODE 250: Performed by: NURSE ANESTHETIST, CERTIFIED REGISTERED

## 2024-01-16 PROCEDURE — C1889 IMPLANT/INSERT DEVICE, NOC: HCPCS | Performed by: ORTHOPAEDIC SURGERY

## 2024-01-16 PROCEDURE — 36000710: Performed by: ORTHOPAEDIC SURGERY

## 2024-01-16 PROCEDURE — 63600175 PHARM REV CODE 636 W HCPCS: Performed by: SPECIALIST

## 2024-01-16 PROCEDURE — 37000009 HC ANESTHESIA EA ADD 15 MINS: Performed by: ORTHOPAEDIC SURGERY

## 2024-01-16 PROCEDURE — 71000015 HC POSTOP RECOV 1ST HR: Performed by: ORTHOPAEDIC SURGERY

## 2024-01-16 PROCEDURE — 27201423 OPTIME MED/SURG SUP & DEVICES STERILE SUPPLY: Performed by: ORTHOPAEDIC SURGERY

## 2024-01-16 PROCEDURE — 71000033 HC RECOVERY, INTIAL HOUR: Performed by: ORTHOPAEDIC SURGERY

## 2024-01-16 PROCEDURE — 63600175 PHARM REV CODE 636 W HCPCS: Performed by: ORTHOPAEDIC SURGERY

## 2024-01-16 PROCEDURE — 63600175 PHARM REV CODE 636 W HCPCS: Performed by: STUDENT IN AN ORGANIZED HEALTH CARE EDUCATION/TRAINING PROGRAM

## 2024-01-16 PROCEDURE — 71000016 HC POSTOP RECOV ADDL HR: Performed by: ORTHOPAEDIC SURGERY

## 2024-01-16 PROCEDURE — D9220A PRA ANESTHESIA: Mod: CRNA,,, | Performed by: NURSE ANESTHETIST, CERTIFIED REGISTERED

## 2024-01-16 PROCEDURE — C1713 ANCHOR/SCREW BN/BN,TIS/BN: HCPCS | Performed by: ORTHOPAEDIC SURGERY

## 2024-01-16 PROCEDURE — 37000008 HC ANESTHESIA 1ST 15 MINUTES: Performed by: ORTHOPAEDIC SURGERY

## 2024-01-16 PROCEDURE — 63600175 PHARM REV CODE 636 W HCPCS: Performed by: NURSE ANESTHETIST, CERTIFIED REGISTERED

## 2024-01-16 DEVICE — SCREW FASTTHREAD INTRF 9X30MM: Type: IMPLANTABLE DEVICE | Site: KNEE | Status: FUNCTIONAL

## 2024-01-16 DEVICE — DEVICE FIX TIGHTROPE II RT FLP: Type: IMPLANTABLE DEVICE | Site: KNEE | Status: FUNCTIONAL

## 2024-01-16 DEVICE — TISSUE POST TIBLS TEND 8-12MM: Type: IMPLANTABLE DEVICE | Site: KNEE | Status: FUNCTIONAL

## 2024-01-16 RX ORDER — FENTANYL CITRATE 50 UG/ML
INJECTION, SOLUTION INTRAMUSCULAR; INTRAVENOUS
Status: DISCONTINUED | OUTPATIENT
Start: 2024-01-16 | End: 2024-01-16

## 2024-01-16 RX ORDER — SODIUM CHLORIDE 9 MG/ML
INJECTION, SOLUTION INTRAVENOUS CONTINUOUS
Status: DISCONTINUED | OUTPATIENT
Start: 2024-01-16 | End: 2024-01-16 | Stop reason: HOSPADM

## 2024-01-16 RX ORDER — OXYCODONE AND ACETAMINOPHEN 5; 325 MG/1; MG/1
2 TABLET ORAL ONCE
Status: DISCONTINUED | OUTPATIENT
Start: 2024-01-16 | End: 2024-01-16 | Stop reason: HOSPADM

## 2024-01-16 RX ORDER — PHENYLEPHRINE HYDROCHLORIDE 10 MG/ML
INJECTION INTRAVENOUS
Status: DISCONTINUED | OUTPATIENT
Start: 2024-01-16 | End: 2024-01-16

## 2024-01-16 RX ORDER — PROCHLORPERAZINE EDISYLATE 5 MG/ML
5 INJECTION INTRAMUSCULAR; INTRAVENOUS ONCE AS NEEDED
Status: DISCONTINUED | OUTPATIENT
Start: 2024-01-16 | End: 2024-01-16 | Stop reason: HOSPADM

## 2024-01-16 RX ORDER — DEXMEDETOMIDINE HYDROCHLORIDE 100 UG/ML
INJECTION, SOLUTION INTRAVENOUS
Status: DISCONTINUED | OUTPATIENT
Start: 2024-01-16 | End: 2024-01-16

## 2024-01-16 RX ORDER — CEFAZOLIN SODIUM 1 G/3ML
2 INJECTION, POWDER, FOR SOLUTION INTRAMUSCULAR; INTRAVENOUS
Status: COMPLETED | OUTPATIENT
Start: 2024-01-16 | End: 2024-01-16

## 2024-01-16 RX ORDER — MIDAZOLAM HYDROCHLORIDE 1 MG/ML
2 INJECTION INTRAMUSCULAR; INTRAVENOUS ONCE
Status: COMPLETED | OUTPATIENT
Start: 2024-01-16 | End: 2024-01-16

## 2024-01-16 RX ORDER — HYDROCODONE BITARTRATE AND ACETAMINOPHEN 10; 325 MG/1; MG/1
1 TABLET ORAL EVERY 6 HOURS PRN
Qty: 28 TABLET | Refills: 0 | Status: SHIPPED | OUTPATIENT
Start: 2024-01-16

## 2024-01-16 RX ORDER — IPRATROPIUM BROMIDE AND ALBUTEROL SULFATE 2.5; .5 MG/3ML; MG/3ML
3 SOLUTION RESPIRATORY (INHALATION) ONCE AS NEEDED
Status: DISCONTINUED | OUTPATIENT
Start: 2024-01-16 | End: 2024-01-16 | Stop reason: HOSPADM

## 2024-01-16 RX ORDER — DIPHENHYDRAMINE HYDROCHLORIDE 50 MG/ML
25 INJECTION INTRAMUSCULAR; INTRAVENOUS ONCE AS NEEDED
Status: DISCONTINUED | OUTPATIENT
Start: 2024-01-16 | End: 2024-01-16 | Stop reason: HOSPADM

## 2024-01-16 RX ORDER — ONDANSETRON HYDROCHLORIDE 2 MG/ML
INJECTION, SOLUTION INTRAVENOUS
Status: DISCONTINUED | OUTPATIENT
Start: 2024-01-16 | End: 2024-01-16

## 2024-01-16 RX ORDER — VANCOMYCIN HYDROCHLORIDE 1 G/20ML
INJECTION, POWDER, LYOPHILIZED, FOR SOLUTION INTRAVENOUS
Status: DISCONTINUED | OUTPATIENT
Start: 2024-01-16 | End: 2024-01-16 | Stop reason: HOSPADM

## 2024-01-16 RX ORDER — PROPOFOL 10 MG/ML
VIAL (ML) INTRAVENOUS
Status: DISCONTINUED | OUTPATIENT
Start: 2024-01-16 | End: 2024-01-16

## 2024-01-16 RX ORDER — GABAPENTIN 600 MG/1
600 TABLET ORAL 3 TIMES DAILY
Qty: 90 TABLET | Refills: 11 | Status: SHIPPED | OUTPATIENT
Start: 2024-01-16 | End: 2025-01-15

## 2024-01-16 RX ORDER — EPINEPHRINE 1 MG/ML
INJECTION INTRAMUSCULAR; INTRAVENOUS; SUBCUTANEOUS
Status: DISCONTINUED | OUTPATIENT
Start: 2024-01-16 | End: 2024-01-16 | Stop reason: HOSPADM

## 2024-01-16 RX ORDER — MIDAZOLAM HYDROCHLORIDE 1 MG/ML
5 INJECTION INTRAMUSCULAR; INTRAVENOUS ONCE
Status: COMPLETED | OUTPATIENT
Start: 2024-01-16 | End: 2024-01-16

## 2024-01-16 RX ORDER — ROCURONIUM BROMIDE 10 MG/ML
INJECTION, SOLUTION INTRAVENOUS
Status: DISCONTINUED | OUTPATIENT
Start: 2024-01-16 | End: 2024-01-16

## 2024-01-16 RX ORDER — HYDROMORPHONE HYDROCHLORIDE 1 MG/ML
0.5 INJECTION, SOLUTION INTRAMUSCULAR; INTRAVENOUS; SUBCUTANEOUS EVERY 5 MIN PRN
Status: DISCONTINUED | OUTPATIENT
Start: 2024-01-16 | End: 2024-01-16 | Stop reason: HOSPADM

## 2024-01-16 RX ORDER — METHOCARBAMOL 750 MG/1
750 TABLET, FILM COATED ORAL 4 TIMES DAILY
Qty: 40 TABLET | Refills: 0 | Status: SHIPPED | OUTPATIENT
Start: 2024-01-16 | End: 2024-01-26

## 2024-01-16 RX ORDER — LABETALOL HYDROCHLORIDE 5 MG/ML
INJECTION, SOLUTION INTRAVENOUS
Status: DISCONTINUED | OUTPATIENT
Start: 2024-01-16 | End: 2024-01-16

## 2024-01-16 RX ORDER — LIDOCAINE HYDROCHLORIDE 20 MG/ML
INJECTION, SOLUTION EPIDURAL; INFILTRATION; INTRACAUDAL; PERINEURAL
Status: DISCONTINUED | OUTPATIENT
Start: 2024-01-16 | End: 2024-01-16

## 2024-01-16 RX ORDER — GLYCOPYRROLATE 0.2 MG/ML
INJECTION INTRAMUSCULAR; INTRAVENOUS
Status: DISCONTINUED | OUTPATIENT
Start: 2024-01-16 | End: 2024-01-16

## 2024-01-16 RX ORDER — SODIUM CHLORIDE, SODIUM LACTATE, POTASSIUM CHLORIDE, CALCIUM CHLORIDE 600; 310; 30; 20 MG/100ML; MG/100ML; MG/100ML; MG/100ML
INJECTION, SOLUTION INTRAVENOUS CONTINUOUS
Status: DISCONTINUED | OUTPATIENT
Start: 2024-01-16 | End: 2024-01-16 | Stop reason: HOSPADM

## 2024-01-16 RX ORDER — MEPERIDINE HYDROCHLORIDE 25 MG/ML
12.5 INJECTION INTRAMUSCULAR; INTRAVENOUS; SUBCUTANEOUS ONCE
Status: DISCONTINUED | OUTPATIENT
Start: 2024-01-16 | End: 2024-01-16 | Stop reason: HOSPADM

## 2024-01-16 RX ORDER — ONDANSETRON HYDROCHLORIDE 2 MG/ML
4 INJECTION, SOLUTION INTRAVENOUS ONCE
Status: DISCONTINUED | OUTPATIENT
Start: 2024-01-16 | End: 2024-01-16 | Stop reason: HOSPADM

## 2024-01-16 RX ORDER — HYDROMORPHONE HYDROCHLORIDE 1 MG/ML
0.2 INJECTION, SOLUTION INTRAMUSCULAR; INTRAVENOUS; SUBCUTANEOUS EVERY 5 MIN PRN
Status: DISCONTINUED | OUTPATIENT
Start: 2024-01-16 | End: 2024-01-16 | Stop reason: HOSPADM

## 2024-01-16 RX ORDER — IBUPROFEN 800 MG/1
800 TABLET ORAL 3 TIMES DAILY
Qty: 60 TABLET | Refills: 0 | Status: SHIPPED | OUTPATIENT
Start: 2024-01-16

## 2024-01-16 RX ORDER — KETOROLAC TROMETHAMINE 30 MG/ML
INJECTION, SOLUTION INTRAMUSCULAR; INTRAVENOUS
Status: DISCONTINUED | OUTPATIENT
Start: 2024-01-16 | End: 2024-01-16

## 2024-01-16 RX ORDER — ROPIVACAINE HYDROCHLORIDE 5 MG/ML
INJECTION, SOLUTION EPIDURAL; INFILTRATION; PERINEURAL
Status: COMPLETED | OUTPATIENT
Start: 2024-01-16 | End: 2024-01-16

## 2024-01-16 RX ORDER — MUPIROCIN 20 MG/G
OINTMENT TOPICAL
Status: DISCONTINUED | OUTPATIENT
Start: 2024-01-16 | End: 2024-01-16 | Stop reason: HOSPADM

## 2024-01-16 RX ADMIN — LABETALOL HYDROCHLORIDE 5 MG: 5 INJECTION INTRAVENOUS at 02:01

## 2024-01-16 RX ADMIN — KETOROLAC TROMETHAMINE 30 MG: 30 INJECTION, SOLUTION INTRAMUSCULAR at 01:01

## 2024-01-16 RX ADMIN — DEXMEDETOMIDINE 10 MCG: 200 INJECTION, SOLUTION INTRAVENOUS at 12:01

## 2024-01-16 RX ADMIN — PROPOFOL 100 MG: 10 INJECTION, EMULSION INTRAVENOUS at 01:01

## 2024-01-16 RX ADMIN — ROCURONIUM BROMIDE 20 MG: 10 INJECTION INTRAVENOUS at 01:01

## 2024-01-16 RX ADMIN — CEFAZOLIN 2 G: 330 INJECTION, POWDER, FOR SOLUTION INTRAMUSCULAR; INTRAVENOUS at 12:01

## 2024-01-16 RX ADMIN — FENTANYL CITRATE 100 MCG: 50 INJECTION INTRAMUSCULAR; INTRAVENOUS at 12:01

## 2024-01-16 RX ADMIN — PROPOFOL 200 MG: 10 INJECTION, EMULSION INTRAVENOUS at 12:01

## 2024-01-16 RX ADMIN — SODIUM CHLORIDE, POTASSIUM CHLORIDE, SODIUM LACTATE AND CALCIUM CHLORIDE: 600; 310; 30; 20 INJECTION, SOLUTION INTRAVENOUS at 12:01

## 2024-01-16 RX ADMIN — ONDANSETRON 4 MG: 2 INJECTION INTRAMUSCULAR; INTRAVENOUS at 01:01

## 2024-01-16 RX ADMIN — ROCURONIUM BROMIDE 30 MG: 10 INJECTION INTRAVENOUS at 12:01

## 2024-01-16 RX ADMIN — DEXMEDETOMIDINE 10 MCG: 200 INJECTION, SOLUTION INTRAVENOUS at 02:01

## 2024-01-16 RX ADMIN — MIDAZOLAM HYDROCHLORIDE 2 MG: 1 INJECTION, SOLUTION INTRAMUSCULAR; INTRAVENOUS at 11:01

## 2024-01-16 RX ADMIN — HYDROMORPHONE HYDROCHLORIDE 0.5 MG: 1 INJECTION, SOLUTION INTRAMUSCULAR; INTRAVENOUS; SUBCUTANEOUS at 03:01

## 2024-01-16 RX ADMIN — DEXMEDETOMIDINE 10 MCG: 200 INJECTION, SOLUTION INTRAVENOUS at 01:01

## 2024-01-16 RX ADMIN — PHENYLEPHRINE HYDROCHLORIDE 50 MCG: 10 INJECTION INTRAVENOUS at 01:01

## 2024-01-16 RX ADMIN — PHENYLEPHRINE HYDROCHLORIDE 100 MCG: 10 INJECTION INTRAVENOUS at 01:01

## 2024-01-16 RX ADMIN — GLYCOPYRROLATE 0.2 MG: 0.2 INJECTION INTRAMUSCULAR; INTRAVENOUS at 12:01

## 2024-01-16 RX ADMIN — SODIUM CHLORIDE, POTASSIUM CHLORIDE, SODIUM LACTATE AND CALCIUM CHLORIDE: 600; 310; 30; 20 INJECTION, SOLUTION INTRAVENOUS at 08:01

## 2024-01-16 RX ADMIN — MIDAZOLAM HYDROCHLORIDE 2 MG: 1 INJECTION, SOLUTION INTRAMUSCULAR; INTRAVENOUS at 08:01

## 2024-01-16 RX ADMIN — HYDROMORPHONE HYDROCHLORIDE 0.5 MG: 1 INJECTION, SOLUTION INTRAMUSCULAR; INTRAVENOUS; SUBCUTANEOUS at 02:01

## 2024-01-16 RX ADMIN — MIDAZOLAM HYDROCHLORIDE 4 MG: 1 INJECTION, SOLUTION INTRAMUSCULAR; INTRAVENOUS at 08:01

## 2024-01-16 RX ADMIN — ROPIVACAINE HYDROCHLORIDE 20 ML: 5 INJECTION, SOLUTION EPIDURAL; INFILTRATION; PERINEURAL at 09:01

## 2024-01-16 RX ADMIN — FENTANYL CITRATE 50 MCG: 50 INJECTION INTRAMUSCULAR; INTRAVENOUS at 01:01

## 2024-01-16 RX ADMIN — SUGAMMADEX 200 MG: 100 INJECTION, SOLUTION INTRAVENOUS at 02:01

## 2024-01-16 RX ADMIN — PHENYLEPHRINE HYDROCHLORIDE 100 MCG: 10 INJECTION INTRAVENOUS at 12:01

## 2024-01-16 RX ADMIN — LIDOCAINE HYDROCHLORIDE 50 MG: 20 INJECTION, SOLUTION EPIDURAL; INFILTRATION; INTRACAUDAL; PERINEURAL at 12:01

## 2024-01-16 NOTE — ANESTHESIA PROCEDURE NOTES
Peripheral Block    Patient location during procedure: pre-op   Block not for primary anesthetic.  Reason for block: post-op pain management   Post-op Pain Location: right knee pain   Start time: 1/16/2024 8:21 AM  Timeout: 1/16/2024 8:20 AM   End time: 1/16/2024 8:26 AM    Staffing  Authorizing Provider: Vita Kruger MD  Performing Provider: Vita Kruger MD    Staffing  Performed by: Vita Kruger MD  Authorized by: Vita Kruger MD    Preanesthetic Checklist  Completed: patient identified, IV checked, site marked, risks and benefits discussed, surgical consent, monitors and equipment checked, pre-op evaluation and timeout performed  Peripheral Block  Patient position: supine  Prep: ChloraPrep  Patient monitoring: heart rate, cardiac monitor, continuous pulse ox, continuous capnometry and frequent blood pressure checks  Block type: adductor canal  Laterality: right  Injection technique: single shot  Needle  Needle type: Stimuplex   Needle gauge: 21 G  Needle length: 4 in  Needle localization: anatomical landmarks and ultrasound guidance   -ultrasound image captured on disc.  Assessment  Injection assessment: negative aspiration, negative parasthesia and local visualized surrounding nerve  Paresthesia pain: none  Heart rate change: no  Slow fractionated injection: yes    Medications:    Medications: ropivacaine (NAROPIN) injection 0.5% - Perineural   20 mL - 1/16/2024 9:23:00 AM    Additional Notes  VSS.  [facility] RN monitoring vitals throughout procedure.  Patient tolerated procedure well.

## 2024-01-16 NOTE — DISCHARGE INSTRUCTIONS
-Activity: nonweightbearing right lower extremity, use crutches, hinged knee brace unlocked 0 to 90  -take narcotic pain medication as prescribed.  If pain is not severe, wean herself from the narcotic pain medication.  You can take up to 4000 mg of Tylenol daily.  Note that the narcotic pain medication that you have been prescribed has Tylenol in it.  We recommend weaning from the narcotic pain medication to taking Tylenol 1000 mg scheduled 3 times a day interspersed with ibuprofen 800 mg 3 times a day.  This way he will be getting something for pain every few hours.  -you will have increased pain after your nerve block wears off  -keeps surgical incisions clean and dry. Keep dressing intact. Reinforce as needed  -call if you have fevers, drainage, or foul smell coming from surgical incisions  -if placed in a splint, do not get the splint wet  -you can take Benadryl if you start to have itching from the splint  -Follow up as scheduled in approximately 2 weeks

## 2024-01-16 NOTE — ANESTHESIA PROCEDURE NOTES
Intubation    Date/Time: 1/16/2024 12:24 PM    Performed by: Nuris Contreras CRNA  Authorized by: Vita Kruger MD    Intubation:     Induction:  Intravenous    Intubated:  Postinduction    Mask Ventilation:  Easy mask    Attempts:  1    Attempted By:  CRNA    Difficult Airway Encountered?: No      Complications:  None    Airway Device:  Supraglottic airway/LMA    Airway Device Size:  5.0    Style/Cuff Inflation:  Other (see comments)    Secured at:  The lips    Placement Verified By:  Capnometry    Complicating Factors:  None    Findings Post-Intubation:  Atraumatic/condition of teeth unchanged and BS equal bilateral  Notes:      igel

## 2024-01-16 NOTE — ANESTHESIA POSTPROCEDURE EVALUATION
Anesthesia Post Evaluation    Patient: Harman Gonzalez    Procedure(s) Performed: Procedure(s) (LRB):  RECONSTRUCTION, KNEE, ACL, USING GRAFT (Right)    Final Anesthesia Type: general      Patient location during evaluation: PACU  Patient participation: Yes- Able to Participate  Level of consciousness: awake and alert and oriented  Post-procedure vital signs: reviewed and stable  Pain management: adequate  Airway patency: patent  ASIA mitigation strategies: Multimodal analgesia, Use of major conduction anesthesia (spinal/epidural) or peripheral nerve block and Verification of full reversal of neuromuscular block  PONV status at discharge: No PONV  Anesthetic complications: no      Cardiovascular status: blood pressure returned to baseline  Respiratory status: unassisted  Hydration status: euvolemic  Follow-up not needed.              Vitals Value Taken Time   /98 01/16/24 1445   Temp 36.9 °C (98.4 °F) 01/16/24 1428   Pulse 67 01/16/24 1445   Resp 20 01/16/24 1445   SpO2 95 % 01/16/24 1450         No case tracking events are documented in the log.      Pain/Sara Score: Pain Rating Prior to Med Admin: 7 (1/16/2024  2:44 PM)  Sara Score: 4 (1/16/2024  2:28 PM)

## 2024-01-16 NOTE — TRANSFER OF CARE
Anesthesia Transfer of Care Note    Patient: Harman Gonzalez    Procedure(s) Performed: Procedure(s) (LRB):  RECONSTRUCTION, KNEE, ACL, USING GRAFT (Right)    Patient location: PACU    Anesthesia Type: general    Transport from OR: Transported from OR on room air with adequate spontaneous ventilation    Post pain: adequate analgesia    Post assessment: no apparent anesthetic complications    Post vital signs: stable    Level of consciousness: sedated    Nausea/Vomiting: no nausea/vomiting    Complications: none    Transfer of care protocol was followed      Last vitals:

## 2024-01-17 VITALS
WEIGHT: 226.63 LBS | HEART RATE: 79 BPM | BODY MASS INDEX: 31.6 KG/M2 | DIASTOLIC BLOOD PRESSURE: 90 MMHG | OXYGEN SATURATION: 99 % | TEMPERATURE: 98 F | SYSTOLIC BLOOD PRESSURE: 134 MMHG | RESPIRATION RATE: 20 BRPM

## 2024-01-17 NOTE — DISCHARGE SUMMARY
[facility] - Periop Services  Discharge Note  Short Stay    Procedure(s) (LRB):  RECONSTRUCTION, KNEE, ACL, USING GRAFT (Right)      OUTCOME: Patient tolerated treatment/procedure well without complication and is now ready for discharge.    DISPOSITION: Home or Self Care    FINAL DIAGNOSIS: right ACL tear    FOLLOWUP: In clinic    DISCHARGE INSTRUCTIONS:    Discharge Procedure Orders   Weight bearing restrictions (specify):   Order Comments: Nonweightbearing right lower extremity, hinged knee brace unlocked 0 - 90 degrees

## 2024-01-17 NOTE — OP NOTE
Operative Note     Date of Service: 01/16/2024     Pre-Operative Diagnosis: right ACL proximal tear    Post-Operative Diagnosis: Same     Procedure(s):   1. Right  ACL reconstruction with PT allograft     Anesthesia: General     Surgeon: Madhu Correa MD, was present and scrubbed for the key portions of the procedure.     Assistant(s):   Benjamin Hong MD    Indications   Patient is a 48 y.o.male with right ACL proximal tear and symptoms of instability after an injury in September 2023. We saw him in clinic on 12/18/23. We recommended ACL reconstruction, right knee arthroscopy. He had instability symptoms and could not do his job as a  due to knee instability. He went though a home exercise program and had good range of motion of the knee. On the phone, we discussed allograft ACL reconstruction and autograft ACL reconstruction. Discussed that with his age and activity level, the literature suggests outcomes are similar. Autograft does come with donor site morbidity. He elected to go the allograft route after all questions answered. The patient was checked again in the Holding Room. The risks, benefits, complications, treatment options, tand expected outcomes were reviewed again with the patient. The patient has elected to proceed with right knee arthroscopy, ACL reconstruction with allograft. The risks and potential complications include but are not limited to infection, graft rupture, scar, nerve injury, vascular injury, persistent pain, arthritis, potential skin necrosis, deep vein thrombosis, possible pulmonary embolus, complications of the anesthetics and failure of the implants with potential need for future surgery to remove or revise. The patient concurred with the proposed plan, giving informed consent. The site of surgery was identified by the patient and properly noted/marked by me.     Implant:   1. PT allograft  2. Arthrex femoral button   3. 9mm Arthrex tibial interference  screw    Findings:  Patellofemoral - Outerbridge grade 3, no loose bodies  Medial gutter without plica or loose bodies  Lateral gutter without plica or loose bodies  Medial compartment - Outerbridge grade 0, medial meniscus intact  Lateral compartment - Outerbridge grade 0, lateral meniscus intact  Notch - Proximal ACL tear with intact stump distally, PCL intact    Procedure Details:   The patient was taken to the operating room and positioned supine. Prophylacic antibiotics were given.The patient was given general anesthesia.   A Time-Out was held and the patient, location and procedure of right knee arthroscopy with ACL reconstruction were verified and agreed upon by all members of the operating room staff.   The right lower extremity was prepped and draped in normal sterile manner. Tourniquet was inflated to 300mm Hg after esmarch.     Standard anteromedial and anterolateral portals were made for diagnostic arthroscopy. The ACL allograft was prepared on the back table. Findings of diagnostic arthroscopy are listed above. We then turned our attention to the notch. The ACL was torn proximally off the femur. The distal stump was debrided with a biter and shaver. We then used the shaver to ensure we could visualize the back wall. We then began our tunnel preparation.     We created an accessory medial portal. We used the accessory medial portal to access the lateral femoral condyle. We used a aiming guide to place our femoral pin. We drove this pin partially out of the lateral aspect of the knee. We then over reamed with a 9mm partially threaded reamer to 25 mm. We then pulled the pin out of the lateral aspect of the knee while shuttling a #5 permanent suture. We then turned our attention to the tibial tunnel. We used a elbow ACL guide placed at 55 degrees into the ACL tibial footprint. We placed our pin and then reamed with a fully threaded 6mm reamer. We then adjusted our pin to improve our tunnel position. We  reamed with a 9mm reamer. We cleaned the knee of all debris with a shaver. We then shuttled the shuttling suture from the femur down to the tibial tunnel. We then passed our graft up into the femur. We used a cortical button to secure the graft on the femoral side. We used a 9mmsoft tissue screw to secure the tibia.  The extraneaous tendon and sutures were removed. The wounds were copiously irrigated.     We closed the deep fascia overlying the tibial tunnel with running #1 vicryl.  The subcutaneous tissue was closed with 2-0 Monocryl and the skin and portals were closed with 3-0 Monocryl suture.  A sterile dressing was applied.  A hinged knee brace was applied.  The patient was awoken unremarkably from anesthesia and transferred back to the postoperative unit. Tourniquet time was [id number] minutes    Instrument, sponge, and needle counts were correct prior to wound closure and at the conclusion of the case.   The patient was awoken from anesthesia, tolerated the procedure well and taken to recovery in stable condition.     Estimated blood loss: 10cc   Drains: none   Total IV fluids: Per anesthesia records   Specimens: none   Complications: None, pt tolerated the procedure well   Disposition: Awakened from anesthesia, and taken to the recovery room in a stable condition, having suffered no apparent untoward event   Condition: Stable   Post-Operative Management   Nonweightbearing right lower extremity for 2 weeks, then partial until 6 weeks  Hinged knee brace unlocked 0-90  Work on quad strength and getting full extension  If elevating, place bumps under ankle and not knee  Keep dressing clean dry and intact, reinforced as needed  Ice  Elevate   Multimodal pain control    [email or web address]

## 2024-01-31 ENCOUNTER — OFFICE VISIT (OUTPATIENT)
Dept: ORTHOPEDICS | Facility: CLINIC | Age: 49
End: 2024-01-31
Payer: MEDICAID

## 2024-01-31 ENCOUNTER — HOSPITAL ENCOUNTER (OUTPATIENT)
Dept: RADIOLOGY | Facility: HOSPITAL | Age: 49
Discharge: HOME | End: 2024-01-31
Attending: ORTHOPAEDIC SURGERY
Payer: MEDICAID

## 2024-01-31 VITALS — BODY MASS INDEX: 31.64 KG/M2 | TEMPERATURE: 98 F | WEIGHT: 226 LBS | HEIGHT: 71 IN

## 2024-01-31 DIAGNOSIS — M25.561 CHRONIC PAIN OF RIGHT KNEE: ICD-10-CM

## 2024-01-31 DIAGNOSIS — M25.561 CHRONIC PAIN OF RIGHT KNEE: Primary | ICD-10-CM

## 2024-01-31 DIAGNOSIS — G89.29 CHRONIC PAIN OF RIGHT KNEE: ICD-10-CM

## 2024-01-31 DIAGNOSIS — G89.29 CHRONIC PAIN OF RIGHT KNEE: Primary | ICD-10-CM

## 2024-01-31 PROCEDURE — 99499 UNLISTED E&M SERVICE: CPT | Mod: ,,, | Performed by: ORTHOPAEDIC SURGERY

## 2024-01-31 PROCEDURE — 73560 X-RAY EXAM OF KNEE 1 OR 2: CPT | Mod: TC,RT

## 2024-01-31 PROCEDURE — 1159F MED LIST DOCD IN RCRD: CPT | Mod: CPTII,,, | Performed by: ORTHOPAEDIC SURGERY

## 2024-01-31 PROCEDURE — 4010F ACE/ARB THERAPY RXD/TAKEN: CPT | Mod: CPTII,,, | Performed by: ORTHOPAEDIC SURGERY

## 2024-01-31 PROCEDURE — 99213 OFFICE O/P EST LOW 20 MIN: CPT | Mod: PBBFAC

## 2024-01-31 RX ORDER — CYCLOBENZAPRINE HCL 5 MG
5 TABLET ORAL 3 TIMES DAILY PRN
Qty: 30 TABLET | Refills: 0 | Status: SHIPPED | OUTPATIENT
Start: 2024-01-31 | End: 2024-02-10

## 2024-01-31 RX ORDER — HYDROCODONE BITARTRATE AND ACETAMINOPHEN 5; 325 MG/1; MG/1
1 TABLET ORAL EVERY 6 HOURS PRN
Qty: 20 TABLET | Refills: 0 | Status: SHIPPED | OUTPATIENT
Start: 2024-01-31

## 2024-02-01 NOTE — PROGRESS NOTES
[facility]  Established Patient Office Visit  02/01/2024       Patient ID: Harman Gonzalez  YOB: 1975  MRN: [id number]    Chief Complaint: Follow-up of the Right Knee (martinPatient is here today for follow up for  right knee.  Has been taking meds; pain: Percocet Which has as needed for pain, which does help. )      HPI:  Harman Gonzalez is a 48 y.o. male status post right knee arthroscopy with PT allograft ACL reconstruction on 01/16/2024.  Overall he is doing well postoperatively.  All of his incisions have healed well.  He has been sleeping with a pillow under his knee and so does have a slight flexion contracture notable.  No new falls or traumas.  No constitutional symptoms.  Has some allie-incisional numbness.  Otherwise doing well.      Past Medical History:    Past Medical History:   Diagnosis Date    ADHD     Hypertension     Sleep apnea      Past Surgical History:   Procedure Laterality Date    ARTHROSCOPY OF KNEE Right 1/16/2024    Procedure: ARTHROSCOPY, KNEE;  Surgeon: Madhu Correa Jr., MD;  Location: [facility];  Service: Orthopedics;  Laterality: Right;    RECONSTRUCTION OF ANTERIOR CRUCIATE LIGAMENT USING GRAFT Right 1/16/2024    Procedure: RECONSTRUCTION, KNEE, ACL, USING GRAFT;  Surgeon: Madhu Correa Jr., MD;  Location: [facility];  Service: Orthopedics;  Laterality: Right;    TONSILLECTOMY       Family History   Problem Relation Age of Onset    No Known Problems Mother     No Known Problems Father      Social History     Socioeconomic History    Marital status: Single   Tobacco Use    Smoking status: Former     Types: Cigarettes, Vaping with nicotine    Smokeless tobacco: Never   Substance and Sexual Activity    Alcohol use: Not Currently    Drug use: Not Currently     Frequency: 7.0 times per week     Types: Marijuana    Sexual activity: Yes   Social History Narrative    ** Merged History Encounter **          Medication List with Changes/Refills   New  Medications    CYCLOBENZAPRINE (FLEXERIL) 5 MG TABLET    Take 1 tablet (5 mg total) by mouth 3 (three) times daily as needed for Muscle spasms.    HYDROCODONE-ACETAMINOPHEN (NORCO) 5-325 MG PER TABLET    Take 1 tablet by mouth every 6 (six) hours as needed for Pain.   Current Medications    ALBUTEROL (PROVENTIL/VENTOLIN HFA) 90 MCG/ACTUATION INHALER    TAKE 2 PUFFS (INHALATION) 4 TIMES PER DAY AS NEEDED FOR COUGH) FOR 10 DAYS    AMLODIPINE (NORVASC) 5 MG TABLET    Take 5 mg by mouth.    ARIPIPRAZOLE (ABILIFY) 5 MG TAB    Take 5 mg by mouth.    BUSPIRONE (BUSPAR) 15 MG TABLET    Take 15 mg by mouth 3 (three) times daily.    CLONIDINE (CATAPRES) 0.1 MG TABLET    Take 0.1 mg by mouth daily as needed.    DEXTROAMPHETAMINE-AMPHETAMINE (ADDERALL) 20 MG TABLET    Take 1 tablet by mouth 2 (two) times daily.    DOXEPIN (SINEQUAN) 25 MG CAPSULE    Take 25 mg by mouth every evening.    DULOXETINE (CYMBALTA) 60 MG CAPSULE    Take 60 mg by mouth 2 (two) times daily.    GABAPENTIN (NEURONTIN) 600 MG TABLET    Take 1 tablet (600 mg total) by mouth 3 (three) times daily.    HYDROCODONE-ACETAMINOPHEN (NORCO)  MG PER TABLET    Take 1 tablet by mouth every 6 (six) hours as needed for Pain.    IBUPROFEN (ADVIL,MOTRIN) 800 MG TABLET    Take 1 tablet (800 mg total) by mouth 3 (three) times daily.    LISINOPRIL 10 MG TABLET    Take 10 mg by mouth.    SILDENAFIL (VIAGRA) 100 MG TABLET    Take 100 mg by mouth daily as needed.    TERBINAFINE HCL (LAMISIL) 250 MG TABLET    Take 250 mg by mouth every morning.     Review of patient's allergies indicates:   Allergen Reactions    Cortisone      Other reaction(s): uncontrollable hiccups    Iodinated contrast media Rash       Physical Exam:    Right Lower extremity:  Incisions healing appropriately without any signs of infection no erythema, induration, fluctuance, drainage, or dehiscence   Range of motion of the knee he lacks 15° of terminal extension and can flex to about 90°  Motor intact:  ehl/fhl/ta/gsc/ham/quad/ip  SILT: dp/sp/t/ring/sa  2+ dp pulse    Imaging:  Independent review  X-rays left knee show appropriate ACL tunnel sites.  No fractures or dislocations.  Cortical button is flipped and inappropriate placement.    Assessment and Plan:    Harman Gonzalez is a 48 y.o. male status post right knee arthroscopy with PT allograft ACL reconstruction on 01/16/2024.      No overall he is doing well however he has been sleeping with a pillow under his knee and does have a 15 degree flexion contracture about the knee.  Discussed this with the patient told him that he needs to sleep with a pillow under his heel and needs to start beginning quad sets pushing his knee to the back of the bed.  He can start doing straight leg raises with therapy.  He should only walk with the hinged knee brace locked in extension and unlock it for range of motion multiple times a day.  We will give him a referral physical therapy starting now for ACL protocol.  He can follow up with us in 4 weeks.    Cuco Kirkpatrick  [facility] Orthopaedic Surgery PGY-5          Orders Placed This Encounter    HYDROcodone-acetaminophen (NORCO) 5-325 mg per tablet    cyclobenzaprine (FLEXERIL) 5 MG tablet

## 2024-02-01 NOTE — PROGRESS NOTES
Faculty Attestation: Harman Gonzalez  was seen at [facility] in the Orthopaedic Clinic. Patient chart reviewed. History of Present Illness, Physical Exam, and Assessment and Plan reviewed. Treatment plan is reasonable and appropriate. Compliance with treatment recommendations is important. No procedure was performed.     Ronald Mililgan MD  Orthopaedic Surgery

## 2024-02-02 ENCOUNTER — TELEPHONE (OUTPATIENT)
Dept: ORTHOPEDICS | Facility: CLINIC | Age: 49
End: 2024-02-02
Payer: MEDICAID

## 2024-02-02 NOTE — LETTER
[facility]   [address] [address]  Phone: [phone]  Fax: [phone]    Name:Harman Gonzalez  :1975   Date:2024     PATIENT IS UNABLE TO WORK AS OF:  [_] Pending treatment.  [_] For approximately [_] Days [_] Weeks [_] Months  [_] Pending diagnostic testing.  [_] Pending surgical treatment.  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  Although a sedentary job is defined as one which involved sitting, a certain amount of walking and standing are required only occasionally and other sedentary criteria are met.    [X] LIGHT WORK: Lifting 20 pounds with frequent lifting and/or carrying objects weighing up to 10 pounds.  Even though the weight lifted may be only a negotiable amount, a job is in the category when it involves sitting most of the time with a degree of pushing/pulling of arm and/or leg controls. Until next appointment on 3/4/2024    [_] MEDIUM WORK: Lifting of 50 pounds maximum with frequent lifting and/or carrying of objects up to 25 pounds.    [_] HEAVY WORK: Lifting of 100 pounds maximum with frequent lifting and/or carrying objects up to 50 pounds.    [_] VERY HEAVY WORK: Lifting objects in excess of 100 pounds with frequent lifting and/or carrying of objects weighing 50 pounds or more.    [_] REGULAR DUTY: [_] No Restrictions. [_] With Restrictions (See comments below0:    COMMENTS    Cuco Kirkpatrick MD

## 2024-03-04 ENCOUNTER — OFFICE VISIT (OUTPATIENT)
Dept: ORTHOPEDICS | Facility: CLINIC | Age: 49
End: 2024-03-04
Payer: MEDICAID

## 2024-03-04 VITALS
OXYGEN SATURATION: 100 % | TEMPERATURE: 98 F | RESPIRATION RATE: 20 BRPM | HEART RATE: 72 BPM | HEIGHT: 71 IN | BODY MASS INDEX: 31.64 KG/M2 | SYSTOLIC BLOOD PRESSURE: 142 MMHG | DIASTOLIC BLOOD PRESSURE: 96 MMHG | WEIGHT: 226 LBS

## 2024-03-04 DIAGNOSIS — S83.511A RUPTURE OF ANTERIOR CRUCIATE LIGAMENT OF RIGHT KNEE, INITIAL ENCOUNTER: Primary | ICD-10-CM

## 2024-03-04 PROCEDURE — 1159F MED LIST DOCD IN RCRD: CPT | Mod: CPTII,,, | Performed by: ORTHOPAEDIC SURGERY

## 2024-03-04 PROCEDURE — 3080F DIAST BP >= 90 MM HG: CPT | Mod: CPTII,,, | Performed by: ORTHOPAEDIC SURGERY

## 2024-03-04 PROCEDURE — 99499 UNLISTED E&M SERVICE: CPT | Mod: ,,, | Performed by: ORTHOPAEDIC SURGERY

## 2024-03-04 PROCEDURE — 99214 OFFICE O/P EST MOD 30 MIN: CPT | Mod: PBBFAC

## 2024-03-04 PROCEDURE — 4010F ACE/ARB THERAPY RXD/TAKEN: CPT | Mod: CPTII,,, | Performed by: ORTHOPAEDIC SURGERY

## 2024-03-04 PROCEDURE — 3077F SYST BP >= 140 MM HG: CPT | Mod: CPTII,,, | Performed by: ORTHOPAEDIC SURGERY

## 2024-03-04 NOTE — PROGRESS NOTES
[facility]  Established Patient Office Visit  03/04/2024       Patient ID: Harman Gonzalez  YOB: 1975  MRN: [id number]    Chief Complaint: Follow-up and Pain of the Right Knee (Follow-up right knee pain 3/10 today hinged knee brace on uses cane to assist with ambulation, PT 2x week )      HPI:  Harman Gonzalez is a 48 y.o. male status post right knee arthroscopy with PT allograft ACL reconstruction on 01/16/2024.  Overall he is doing well postoperatively.  All of his incisions have healed well.  He is working with physical therapy with range of motion.  He was wearing a hinged knee brace 0-90..  No new falls or traumas.  No constitutional symptoms.  Has some allie-incisional numbness.  Otherwise doing well.      Past Medical History:    Past Medical History:   Diagnosis Date    ADHD     Hypertension     Sleep apnea      Past Surgical History:   Procedure Laterality Date    ARTHROSCOPY OF KNEE Right 1/16/2024    Procedure: ARTHROSCOPY, KNEE;  Surgeon: Madhu Correa Jr., MD;  Location: [facility];  Service: Orthopedics;  Laterality: Right;    RECONSTRUCTION OF ANTERIOR CRUCIATE LIGAMENT USING GRAFT Right 1/16/2024    Procedure: RECONSTRUCTION, KNEE, ACL, USING GRAFT;  Surgeon: Madhu Correa Jr., MD;  Location: [facility];  Service: Orthopedics;  Laterality: Right;    TONSILLECTOMY       Family History   Problem Relation Age of Onset    No Known Problems Mother     No Known Problems Father      Social History     Socioeconomic History    Marital status: Single   Tobacco Use    Smoking status: Former     Types: Cigarettes, Vaping with nicotine    Smokeless tobacco: Never   Substance and Sexual Activity    Alcohol use: Not Currently    Drug use: Not Currently     Frequency: 7.0 times per week     Types: Marijuana    Sexual activity: Yes   Social History Narrative    ** Merged History Encounter **          Medication List with Changes/Refills   Current Medications    ALBUTEROL  (PROVENTIL/VENTOLIN HFA) 90 MCG/ACTUATION INHALER    TAKE 2 PUFFS (INHALATION) 4 TIMES PER DAY AS NEEDED FOR COUGH) FOR 10 DAYS    AMLODIPINE (NORVASC) 5 MG TABLET    Take 5 mg by mouth.    ARIPIPRAZOLE (ABILIFY) 5 MG TAB    Take 5 mg by mouth.    BUSPIRONE (BUSPAR) 15 MG TABLET    Take 15 mg by mouth 3 (three) times daily.    CLONIDINE (CATAPRES) 0.1 MG TABLET    Take 0.1 mg by mouth daily as needed.    DEXTROAMPHETAMINE-AMPHETAMINE (ADDERALL) 20 MG TABLET    Take 1 tablet by mouth 2 (two) times daily.    DOXEPIN (SINEQUAN) 25 MG CAPSULE    Take 25 mg by mouth every evening.    DULOXETINE (CYMBALTA) 60 MG CAPSULE    Take 60 mg by mouth 2 (two) times daily.    GABAPENTIN (NEURONTIN) 600 MG TABLET    Take 1 tablet (600 mg total) by mouth 3 (three) times daily.    HYDROCODONE-ACETAMINOPHEN (NORCO)  MG PER TABLET    Take 1 tablet by mouth every 6 (six) hours as needed for Pain.    HYDROCODONE-ACETAMINOPHEN (NORCO) 5-325 MG PER TABLET    Take 1 tablet by mouth every 6 (six) hours as needed for Pain.    IBUPROFEN (ADVIL,MOTRIN) 800 MG TABLET    Take 1 tablet (800 mg total) by mouth 3 (three) times daily.    LISINOPRIL 10 MG TABLET    Take 10 mg by mouth.    SILDENAFIL (VIAGRA) 100 MG TABLET    Take 100 mg by mouth daily as needed.    TERBINAFINE HCL (LAMISIL) 250 MG TABLET    Take 250 mg by mouth every morning.     Review of patient's allergies indicates:   Allergen Reactions    Cortisone      Other reaction(s): uncontrollable hiccups    Iodinated contrast media Rash       Physical Exam:    Right Lower extremity:  Incisions healing appropriately without any signs of infection no erythema, induration, fluctuance, drainage, or dehiscence   Range of motion of the knee he lacks 5° of terminal extension and can flex to about 90°  Quad 4/5 strength   Motor intact: ehl/fhl/ta/gsc/ham/quad/ip  SILT: dp/sp/t/ring/sa  2+ dp pulse    Imaging:  None    Assessment and Plan:    Harman Gonzalez is a 48 y.o. male status post right  knee arthroscopy with PT allograft ACL reconstruction on 01/16/2024.      Overall he is doing well.  He is progressing with physical therapy.  He is wearing his hinged knee brace.  Still has significant quad weakness.  He is returning back to work where he will be able to perform his duties without squatting and wide towards his knee brace.  Continue working with physical therapy following the ACL protocol given.  He can follow up with us in 6 weeks for repeat examination.    Cuco Kirkpatrick  [facility] Orthopaedic Surgery PGY-5

## 2024-04-10 NOTE — PROGRESS NOTES
"[facility]  Established Patient Office Visit  04/15/2024       Patient ID: Harman Gonzalez  YOB: 1975  MRN: [id number]    Chief Complaint: Numbness of the Right Knee (Patient is here today for follow up for  rt knee.  Patient has also been doing home exercise which  has been helping. States knee is numb and feels "weird".)      Orthopaedic Surgeries:  Right knee arthroscopy, ACL reconstruction posterior tib allograft-01/16/2024 (Madeline)    HPI:  Harman Gonzalez is a 48 y.o. male status post above.  Has been working with physical therapy.  He is now 3 months postop.  Here today for repeat clinical exam.    He is using the mass gen protocol for ACL rehab.  Has been working with the therapist.  Range of motion is 5-110.  Still with significant quad weakness.  Is asking to be cleared to go back to work.      12 point ROS performed and negative except as above.     Past Medical History:    Past Medical History:   Diagnosis Date    ADHD     Hypertension     Sleep apnea      Past Surgical History:   Procedure Laterality Date    ARTHROSCOPY OF KNEE Right 1/16/2024    Procedure: ARTHROSCOPY, KNEE;  Surgeon: Madhu Correa Jr., MD;  Location: [facility];  Service: Orthopedics;  Laterality: Right;    RECONSTRUCTION OF ANTERIOR CRUCIATE LIGAMENT USING GRAFT Right 1/16/2024    Procedure: RECONSTRUCTION, KNEE, ACL, USING GRAFT;  Surgeon: Madhu Correa Jr., MD;  Location: [facility];  Service: Orthopedics;  Laterality: Right;    TONSILLECTOMY       Family History   Problem Relation Name Age of Onset    No Known Problems Mother      No Known Problems Father       Social History     Socioeconomic History    Marital status: Single   Tobacco Use    Smoking status: Former     Types: Cigarettes, Vaping with nicotine    Smokeless tobacco: Never   Substance and Sexual Activity    Alcohol use: Not Currently    Drug use: Not Currently     Frequency: 7.0 times per week     Types: Marijuana    Sexual " activity: Yes   Social History Narrative    ** Merged History Encounter **          Medication List with Changes/Refills   Current Medications    ALBUTEROL (PROVENTIL/VENTOLIN HFA) 90 MCG/ACTUATION INHALER    TAKE 2 PUFFS (INHALATION) 4 TIMES PER DAY AS NEEDED FOR COUGH) FOR 10 DAYS    AMLODIPINE (NORVASC) 5 MG TABLET    Take 5 mg by mouth.    ARIPIPRAZOLE (ABILIFY) 5 MG TAB    Take 5 mg by mouth.    BUSPIRONE (BUSPAR) 15 MG TABLET    Take 15 mg by mouth 3 (three) times daily.    CLONIDINE (CATAPRES) 0.1 MG TABLET    Take 0.1 mg by mouth daily as needed.    DEXTROAMPHETAMINE-AMPHETAMINE (ADDERALL) 20 MG TABLET    Take 1 tablet by mouth 2 (two) times daily.    DOXEPIN (SINEQUAN) 25 MG CAPSULE    Take 25 mg by mouth every evening.    DULOXETINE (CYMBALTA) 60 MG CAPSULE    Take 60 mg by mouth 2 (two) times daily.    GABAPENTIN (NEURONTIN) 600 MG TABLET    Take 1 tablet (600 mg total) by mouth 3 (three) times daily.    HYDROCODONE-ACETAMINOPHEN (NORCO)  MG PER TABLET    Take 1 tablet by mouth every 6 (six) hours as needed for Pain.    HYDROCODONE-ACETAMINOPHEN (NORCO) 5-325 MG PER TABLET    Take 1 tablet by mouth every 6 (six) hours as needed for Pain.    IBUPROFEN (ADVIL,MOTRIN) 800 MG TABLET    Take 1 tablet (800 mg total) by mouth 3 (three) times daily.    LISINOPRIL 10 MG TABLET    Take 10 mg by mouth.    SILDENAFIL (VIAGRA) 100 MG TABLET    Take 100 mg by mouth daily as needed.    TERBINAFINE HCL (LAMISIL) 250 MG TABLET    Take 250 mg by mouth every morning.     Review of patient's allergies indicates:   Allergen Reactions    Cortisone      Other reaction(s): uncontrollable hiccups    Iodinated contrast media Rash       Physical Exam:  Right lower extremity:  Surgical incision is well healed  Range of motion is 5-110   Quad strength 4/5 compared to contralateral  Motor intact: EHL/FHL/TA/GSC  SILT: DP/SP/T/Malone/Sa  Palp DP pulse    Imaging independently interpreted:  No new    Assessment and Plan:    Harman  Lisa is a 48 y.o. male 3 months status post right ACL reconstruction.  In therapy, mass gen protocol, still with significant quad weakness.    -Continue therapy, emphasized quad strengthening  -Will clear for work - no squatting or kneeling, he reports his new job will allow him to do this   -Follow up 6 weeks repeat exam, check PT progress, there is some question about his compliance with therapy     WB Status:  Weightbearing as tolerated  Follow up: 6 weeks    Karis Burgess MD  [employer] [facility] PGY3

## 2024-04-15 ENCOUNTER — OFFICE VISIT (OUTPATIENT)
Dept: ORTHOPEDICS | Facility: CLINIC | Age: 49
End: 2024-04-15
Payer: MEDICAID

## 2024-04-15 VITALS
DIASTOLIC BLOOD PRESSURE: 99 MMHG | BODY MASS INDEX: 29.58 KG/M2 | HEART RATE: 86 BPM | WEIGHT: 211.31 LBS | SYSTOLIC BLOOD PRESSURE: 170 MMHG | TEMPERATURE: 98 F | HEIGHT: 71 IN

## 2024-04-15 DIAGNOSIS — S83.511D RUPTURE OF ANTERIOR CRUCIATE LIGAMENT OF RIGHT KNEE, SUBSEQUENT ENCOUNTER: Primary | ICD-10-CM

## 2024-04-15 PROCEDURE — 3077F SYST BP >= 140 MM HG: CPT | Mod: CPTII,,, | Performed by: ORTHOPAEDIC SURGERY

## 2024-04-15 PROCEDURE — 1159F MED LIST DOCD IN RCRD: CPT | Mod: CPTII,,, | Performed by: ORTHOPAEDIC SURGERY

## 2024-04-15 PROCEDURE — 99024 POSTOP FOLLOW-UP VISIT: CPT | Mod: ,,, | Performed by: ORTHOPAEDIC SURGERY

## 2024-04-15 PROCEDURE — 99213 OFFICE O/P EST LOW 20 MIN: CPT | Mod: PBBFAC

## 2024-04-15 PROCEDURE — 4010F ACE/ARB THERAPY RXD/TAKEN: CPT | Mod: CPTII,,, | Performed by: ORTHOPAEDIC SURGERY

## 2024-04-15 PROCEDURE — 3080F DIAST BP >= 90 MM HG: CPT | Mod: CPTII,,, | Performed by: ORTHOPAEDIC SURGERY

## 2024-04-15 NOTE — LETTER
April 15, 2024      [facility]  [facility] [address]  Phone: [phone]       Patient: Harman Gonzalez   YOB: 1975  Date of Visit: 04/15/2024    To Whom It May Concern:    French Gonzalez  was at [facility] on 04/15/2024. The patient may return to work/school on 04/16/2024 with restrictions.No squatting are kneeling for 6 weeks are lm If you have any questions or concerns, or if I can be of further assistance, please do not hesitate to contact me.    Sincerely,    Yvette Shea MA

## 2024-04-15 NOTE — LETTER
April 16, 2024      [facility]  [address] [place] [id number]  Phone: [phone]       Patient: Harman Gonzalez   YOB: 1975  Date of Visit: 04/16/2024    To Whom It May Concern:    French Gonzalez  was at [facility] on 04/16/2024. The patient may return to work/school on 04/16/2024 with no restrictions.Patient can return to work full duties with no restrictions If you have any questions or concerns, or if I can be of further assistance, please do not hesitate to contact me.    Sincerely,    Yvette Shea MA

## 2024-04-15 NOTE — PROGRESS NOTES
Faculty Attestation: Harman Gonzalez  was seen at [facility] in the Orthopaedic Clinic. Patient chart reviewed. History of Present Illness, Physical Exam, and Assessment and Plan reviewed. Treatment plan is reasonable and appropriate. Compliance with treatment recommendations is important. No procedure was performed.     Aditya Sarmiento MD  Orthopaedic Surgery

## 2024-05-29 ENCOUNTER — OFFICE VISIT (OUTPATIENT)
Dept: ORTHOPEDICS | Facility: CLINIC | Age: 49
End: 2024-05-29
Payer: MEDICAID

## 2024-05-29 VITALS — BODY MASS INDEX: 30.1 KG/M2 | TEMPERATURE: 98 F | WEIGHT: 215 LBS | HEIGHT: 71 IN

## 2024-05-29 DIAGNOSIS — S83.511D RUPTURE OF ANTERIOR CRUCIATE LIGAMENT OF RIGHT KNEE, SUBSEQUENT ENCOUNTER: Primary | ICD-10-CM

## 2024-05-29 PROCEDURE — 4010F ACE/ARB THERAPY RXD/TAKEN: CPT | Mod: CPTII,,, | Performed by: SPECIALIST

## 2024-05-29 PROCEDURE — 1159F MED LIST DOCD IN RCRD: CPT | Mod: CPTII,,, | Performed by: SPECIALIST

## 2024-05-29 PROCEDURE — 99499 UNLISTED E&M SERVICE: CPT | Mod: S$PBB,,, | Performed by: SPECIALIST

## 2024-05-29 PROCEDURE — 3008F BODY MASS INDEX DOCD: CPT | Mod: CPTII,,, | Performed by: SPECIALIST

## 2024-05-29 PROCEDURE — 99213 OFFICE O/P EST LOW 20 MIN: CPT | Mod: PBBFAC

## 2024-05-29 NOTE — PROGRESS NOTES
[facility] Orthopaedic Surgery Clinic Note    In brief, Harman Gonzalez is a 48 y.o. male status post right knee arthroscopy with ACL reconstruction using allograft on 01/16/2024.  Last seen on 4/15 at which time he was still having significant quadriceps weakness.  Today he states that overall he is doing well, he is back to work.  Unable to go to physical therapy due to his job.  States he feels stable, occasionally using the hinged knee brace      PMH:   Past Medical History:   Diagnosis Date    ADHD     Hypertension     Sleep apnea        PSH:   Past Surgical History:   Procedure Laterality Date    ARTHROSCOPY OF KNEE Right 1/16/2024    Procedure: ARTHROSCOPY, KNEE;  Surgeon: Madhu Correa Jr., MD;  Location: [facility];  Service: Orthopedics;  Laterality: Right;    RECONSTRUCTION OF ANTERIOR CRUCIATE LIGAMENT USING GRAFT Right 1/16/2024    Procedure: RECONSTRUCTION, KNEE, ACL, USING GRAFT;  Surgeon: Madhu Correa Jr., MD;  Location: [facility];  Service: Orthopedics;  Laterality: Right;    TONSILLECTOMY         SH:   Social History     Socioeconomic History    Marital status: Single   Tobacco Use    Smoking status: Former     Types: Cigarettes, Vaping with nicotine    Smokeless tobacco: Never   Substance and Sexual Activity    Alcohol use: Not Currently    Drug use: Not Currently     Frequency: 7.0 times per week     Types: Marijuana    Sexual activity: Yes   Social History Narrative    ** Merged History Encounter **            FH:   Family History   Problem Relation Name Age of Onset    No Known Problems Mother      No Known Problems Father         Allergies:   Review of patient's allergies indicates:   Allergen Reactions    Cortisone      Other reaction(s): uncontrollable hiccups    Iodinated contrast media Rash       ROS:  Constitutional- no fever, fatigue, weakness  Eye- no vision loss, eye redness, drainage, or pain  ENMT- no sore throat, ear pain, sinus pain/congestion, nasal congestion/drainage  Respiratory- no  cough, wheezing, or shortness of breath  CV- no chest pain, no palpitations, no edema  GI- no N/V/D; no abdominal pain    Physical Exam:  There were no vitals filed for this visit.    General: NAD  Cardio: RRR by peripheral pulse  Pulm: Normal WOB on room air, symmetric chest rise  Abd: Soft, NT/ND    MSK:  RLE-  Surgical incision clean dry intact no evidence of infection   0120 range of motion  5/5 quad strength   1A Lachman   Neurovascular intact    Imaging:   None    Assessment:  48-year-old male status post right ACL reconstruction using allograft on 01/16/2024    -continue therapy exercises at home, can get rid of the hinged knee brace and transition to a sleeve   Follow up in 3 months for repeat evaluation    Sorin Jewell MD  [facility]  5/29/2024 1:57 PM

## 2024-09-20 ENCOUNTER — OFFICE VISIT (OUTPATIENT)
Dept: ORTHOPEDICS | Facility: CLINIC | Age: 49
End: 2024-09-20
Payer: COMMERCIAL

## 2024-09-20 VITALS — BODY MASS INDEX: 31.5 KG/M2 | HEIGHT: 71 IN | TEMPERATURE: 99 F | WEIGHT: 225 LBS

## 2024-09-20 DIAGNOSIS — S83.511A RUPTURE OF ANTERIOR CRUCIATE LIGAMENT OF RIGHT KNEE, INITIAL ENCOUNTER: Primary | ICD-10-CM

## 2024-09-20 PROCEDURE — 99213 OFFICE O/P EST LOW 20 MIN: CPT | Mod: PBBFAC

## 2024-09-20 NOTE — LETTER
September 20, 2024      [facility]  [address] [place] [id number]  Phone: [phone]       Patient: Harman Gonzalez   YOB: 1975  Date of Visit: 09/20/2024    To Whom It May Concern:    French Gonzalez  was at [facility] on 09/20/2024. The patient may return to work/school on 09/21/24 with no restrictions. If you have any questions or concerns, or if I can be of further assistance, please do not hesitate to contact me.    Sincerely,    Yvette Shea MA

## 2024-09-20 NOTE — PROGRESS NOTES
Faculty Attestation: Harman Gonzalez  was seen at [facility] in the Orthopaedic Clinic. Patient seen and evaluated at the time of the visit. History of Present Illness, Physical Exam, and Assessment and Plan reviewed. Treatment plan is reasonable and appropriate. Compliance with treatment recommendations is important. No procedure was performed.     Steve Santiago MD  Orthopaedic Surgery

## 2024-09-20 NOTE — PROGRESS NOTES
[facility] Orthopaedic Surgery Clinic Note    In brief, Harman Gonzalez is a 49 y.o. male status post right knee arthroscopy with ACL reconstruction using allograft on 01/16/2024.  Last seen on 5/29 at which time he was still having significant quadriceps weakness.  Today he states that overall he is doing well, he is back to work.  Unable to go to physical therapy due to his job.  States he feels stable, occasionally his leg will give out due to quad weakness.  He also did have a recent injury to his hamstring but states this is getting better.    PMH:   Past Medical History:   Diagnosis Date    ADHD     Hypertension     Sleep apnea        PSH:   Past Surgical History:   Procedure Laterality Date    ARTHROSCOPY OF KNEE Right 1/16/2024    Procedure: ARTHROSCOPY, KNEE;  Surgeon: Madhu Correa Jr., MD;  Location: [facility];  Service: Orthopedics;  Laterality: Right;    RECONSTRUCTION OF ANTERIOR CRUCIATE LIGAMENT USING GRAFT Right 1/16/2024    Procedure: RECONSTRUCTION, KNEE, ACL, USING GRAFT;  Surgeon: Madhu Correa Jr., MD;  Location: [facility];  Service: Orthopedics;  Laterality: Right;    TONSILLECTOMY         SH:   Social History     Socioeconomic History    Marital status: Single   Tobacco Use    Smoking status: Former     Types: Cigarettes, Vaping with nicotine    Smokeless tobacco: Never   Substance and Sexual Activity    Alcohol use: Not Currently    Drug use: Not Currently     Frequency: 7.0 times per week     Types: Marijuana    Sexual activity: Yes   Social History Narrative    ** Merged History Encounter **            FH:   Family History   Problem Relation Name Age of Onset    No Known Problems Mother      No Known Problems Father         Allergies:   Review of patient's allergies indicates:   Allergen Reactions    Cortisone      Other reaction(s): uncontrollable hiccups    Iodinated contrast media Rash       ROS:  Constitutional- no fever, fatigue, weakness  Eye- no vision loss, eye redness, drainage, or  pain  ENMT- no sore throat, ear pain, sinus pain/congestion, nasal congestion/drainage  Respiratory- no cough, wheezing, or shortness of breath  CV- no chest pain, no palpitations, no edema  GI- no N/V/D; no abdominal pain    Physical Exam:  Vitals:    09/20/24 1011   Temp: 98.7 °F (37.1 °C)       General: NAD  Cardio: RRR by peripheral pulse  Pulm: Normal WOB on room air, symmetric chest rise  Abd: Soft, NT/ND    MSK:  RLE-  Surgical incision clean dry intact no evidence of infection   0130 range of motion  4+/5 quad strength   1A Lachman   Neurovascular intact    Imaging:   None    Assessment:  48-year-old male status post right ACL reconstruction using allograft on 01/16/2024    - instructed patient on performing quad exercises.  He can not go to physical therapy due to his job.  Encouraged him to begin returning to full activities gradually.  He may follow up as needed in the future.    Aditya Nielson MD  [facility]  9/20/2024 1:57 PM

## 2025-07-23 DIAGNOSIS — H53.143 PHOTOPHOBIA OF BOTH EYES: ICD-10-CM

## 2025-07-23 DIAGNOSIS — R51.9 INTRACTABLE HEADACHE, UNSPECIFIED CHRONICITY PATTERN, UNSPECIFIED HEADACHE TYPE: Primary | ICD-10-CM

## (undated) DEVICE — PAD ABDOMINAL STERILE 8X10IN

## (undated) DEVICE — DRAPE U STD FILM LG 60X84IN

## (undated) DEVICE — DRAPE FULL SHEET 70X100IN

## (undated) DEVICE — SOL NACL IRR 3000ML

## (undated) DEVICE — CLOSURE SKIN STERI STRIP 1/2X4

## (undated) DEVICE — TOWEL OR DISP STRL BLUE 4/PK

## (undated) DEVICE — SUT TAPE TIGERLOOP 1.3MM

## (undated) DEVICE — BANDAGE ACE ELASTIC 6"

## (undated) DEVICE — GLOVE SIGNATURE ESSNTL LTX 7

## (undated) DEVICE — COVER PROXIMA MAYO STAND

## (undated) DEVICE — PENCIL ELECSURG ROCKER 15FT

## (undated) DEVICE — SUPPORT ULNA NERVE PROTECTOR

## (undated) DEVICE — CONTAINER SPECIMEN OR STER 4OZ

## (undated) DEVICE — SUT TAPE FIBERLOOP 1.3MM

## (undated) DEVICE — APPLICATOR CHLORAPREP ORN 26ML

## (undated) DEVICE — SYR 10CC LUER LOCK

## (undated) DEVICE — SPONGE GAUZE 4X4 12 PLY STRL

## (undated) DEVICE — PAD CAST SPECIALIST STRL 6

## (undated) DEVICE — SYR IRRIGATION BULB STER 60ML

## (undated) DEVICE — BLADE SURG STAINLESS STEEL #15

## (undated) DEVICE — PACK SURGICAL KNEE SCOPE

## (undated) DEVICE — YANKAUER FLEX NO VENT REG CAP

## (undated) DEVICE — DRAPE STERI U-SHAPED 47X51IN

## (undated) DEVICE — COVER MAYO STND XL 30X57IN

## (undated) DEVICE — SUT MONO PLUS 2-0 CP-1 27IN

## (undated) DEVICE — REAMER LOW PROFILE 9 MM

## (undated) DEVICE — BLADE SURG STAINLESS STEEL #10

## (undated) DEVICE — BLADE SHAVER LANZA 4.2X13CM

## (undated) DEVICE — HANDLE DEVON RIGID OR LIGHT

## (undated) DEVICE — SUPPORT DONUT ADULT 9IN

## (undated) DEVICE — PAD ABD TNDRSRB 7.5X8 STRL

## (undated) DEVICE — NDL HYPO REG 25G X 1 1/2

## (undated) DEVICE — TIP SUCTION YANKAUER

## (undated) DEVICE — GOWN POLY REINF BRTH SLV XL

## (undated) DEVICE — STRIP MEDI WND CLSR 1/2X4IN

## (undated) DEVICE — PADDING CAST 4IN SPECIALIST

## (undated) DEVICE — GLOVE SIGNATURE MICRO LTX 7

## (undated) DEVICE — SUT FIBERLINK TAPE BLU WHT 1.3

## (undated) DEVICE — GAUZE SPONGE XRAY 4X4

## (undated) DEVICE — NDL FLTR 5MCRN BLNT TIP 18GX1

## (undated) DEVICE — SAWBLADE TRANS TIB ACL

## (undated) DEVICE — SUT 3-0 MONOCRYL PLUS PS-2

## (undated) DEVICE — NDL SYR 10ML 18X1.5 LL BLUNT

## (undated) DEVICE — GOWN POLY REINF X-LONG 2XL

## (undated) DEVICE — BIT DRILL ACL TIGHTROPE 4MM

## (undated) DEVICE — GLOVE PROTEXIS HYDROGEL SZ7.5

## (undated) DEVICE — ADHESIVE DERMABOND ADVANCED

## (undated) DEVICE — MANIFOLD 4 PORT

## (undated) DEVICE — PAD PREP CUFFED NS 24X48IN

## (undated) DEVICE — BRACE KNEE POST OP HNG PD 17IN

## (undated) DEVICE — TUBING MEDI-VAC 20FT .25IN

## (undated) DEVICE — CUFF TOURNIQUET DL PRT

## (undated) DEVICE — TOURNIQUET SB QC DP 34X4IN

## (undated) DEVICE — Device

## (undated) DEVICE — TUBE SET INFLOW/OUTFLOW

## (undated) DEVICE — COVER TABLE HVY DTY 60X90IN

## (undated) DEVICE — BNDG COFLEX FOAM LF2 ST 6X5YD

## (undated) DEVICE — SUT 1 36IN COATED VICRYL UN

## (undated) DEVICE — MARKER WRITESITE SKIN CHLRAPRP

## (undated) DEVICE — KIT SURGICAL TURNOVER

## (undated) DEVICE — GLOVE SENSICARE PI GRN 8

## (undated) DEVICE — GLOVE SIGNATURE ESSNTL LTX 8

## (undated) DEVICE — DRESSING GAUZE XEROFORM 5X9

## (undated) DEVICE — GLOVE SIGNATURE MICRO LTX 8

## (undated) DEVICE — GLOVE SENSICARE PI GRN 7